# Patient Record
Sex: FEMALE | Race: WHITE | NOT HISPANIC OR LATINO | Employment: FULL TIME | URBAN - METROPOLITAN AREA
[De-identification: names, ages, dates, MRNs, and addresses within clinical notes are randomized per-mention and may not be internally consistent; named-entity substitution may affect disease eponyms.]

---

## 2022-10-07 ENCOUNTER — LAB (OUTPATIENT)
Dept: LAB | Facility: HOSPITAL | Age: 32
End: 2022-10-07

## 2022-10-07 ENCOUNTER — TRANSCRIBE ORDERS (OUTPATIENT)
Dept: LAB | Facility: HOSPITAL | Age: 32
End: 2022-10-07

## 2022-10-07 DIAGNOSIS — N97.9 PRIMARY FEMALE INFERTILITY: Primary | ICD-10-CM

## 2022-10-07 DIAGNOSIS — N97.9 PRIMARY FEMALE INFERTILITY: ICD-10-CM

## 2022-10-07 LAB
FSH SERPL-ACNC: 5.06 MIU/ML
LH SERPL-ACNC: 3.2 MIU/ML
TSH SERPL DL<=0.05 MIU/L-ACNC: 2.25 UIU/ML (ref 0.27–4.2)

## 2022-10-07 PROCEDURE — 83002 ASSAY OF GONADOTROPIN (LH): CPT

## 2022-10-07 PROCEDURE — 82627 DEHYDROEPIANDROSTERONE: CPT

## 2022-10-07 PROCEDURE — 84443 ASSAY THYROID STIM HORMONE: CPT

## 2022-10-07 PROCEDURE — 84403 ASSAY OF TOTAL TESTOSTERONE: CPT

## 2022-10-07 PROCEDURE — 82681 ASSAY DIR MEAS FR ESTRADIOL: CPT

## 2022-10-07 PROCEDURE — 36415 COLL VENOUS BLD VENIPUNCTURE: CPT

## 2022-10-07 PROCEDURE — 82670 ASSAY OF TOTAL ESTRADIOL: CPT

## 2022-10-07 PROCEDURE — 84402 ASSAY OF FREE TESTOSTERONE: CPT

## 2022-10-07 PROCEDURE — 83001 ASSAY OF GONADOTROPIN (FSH): CPT

## 2022-10-07 PROCEDURE — 83498 ASY HYDROXYPROGESTERONE 17-D: CPT

## 2022-10-08 LAB — DHEA-S SERPL-MCNC: 487 UG/DL (ref 84.8–378)

## 2022-10-10 LAB
TESTOST FREE SERPL-MCNC: 3.2 PG/ML (ref 0–4.2)
TESTOST SERPL-MCNC: 23 NG/DL (ref 8–60)

## 2022-10-17 LAB — 17OHP SERPL-MCNC: 63 NG/DL

## 2022-10-18 LAB
ESTRADIOL FREE MFR SERPL: 1.8 %
ESTRADIOL FREE SERPL-MCNC: 0.92 PG/ML
ESTRADIOL SERPL HS-MCNC: 51 PG/ML

## 2022-10-29 ENCOUNTER — TRANSCRIBE ORDERS (OUTPATIENT)
Dept: LAB | Facility: HOSPITAL | Age: 32
End: 2022-10-29

## 2022-10-29 ENCOUNTER — LAB (OUTPATIENT)
Dept: LAB | Facility: HOSPITAL | Age: 32
End: 2022-10-29

## 2022-10-29 DIAGNOSIS — N97.9 PRIMARY FEMALE INFERTILITY: Primary | ICD-10-CM

## 2022-10-29 DIAGNOSIS — N97.9 PRIMARY FEMALE INFERTILITY: ICD-10-CM

## 2022-10-29 LAB — PROGEST SERPL-MCNC: 15.9 NG/ML

## 2022-10-29 PROCEDURE — 36415 COLL VENOUS BLD VENIPUNCTURE: CPT

## 2022-10-29 PROCEDURE — 84144 ASSAY OF PROGESTERONE: CPT

## 2022-11-26 ENCOUNTER — LAB (OUTPATIENT)
Dept: LAB | Facility: HOSPITAL | Age: 32
End: 2022-11-26

## 2022-11-26 ENCOUNTER — TRANSCRIBE ORDERS (OUTPATIENT)
Dept: LAB | Facility: HOSPITAL | Age: 32
End: 2022-11-26

## 2022-11-26 DIAGNOSIS — N97.0 FEMALE INFERTILITY ASSOCIATED WITH ANOVULATION: ICD-10-CM

## 2022-11-26 DIAGNOSIS — N97.0 FEMALE INFERTILITY ASSOCIATED WITH ANOVULATION: Primary | ICD-10-CM

## 2022-11-26 LAB — PROGEST SERPL-MCNC: 22 NG/ML

## 2022-11-26 PROCEDURE — 36415 COLL VENOUS BLD VENIPUNCTURE: CPT

## 2022-11-26 PROCEDURE — 84144 ASSAY OF PROGESTERONE: CPT

## 2023-04-19 ENCOUNTER — TELEPHONE (OUTPATIENT)
Dept: CARDIOLOGY | Facility: CLINIC | Age: 33
End: 2023-04-19

## 2023-04-19 ENCOUNTER — OFFICE VISIT (OUTPATIENT)
Dept: CARDIOLOGY | Facility: CLINIC | Age: 33
End: 2023-04-19
Payer: COMMERCIAL

## 2023-04-19 VITALS
SYSTOLIC BLOOD PRESSURE: 118 MMHG | BODY MASS INDEX: 29.37 KG/M2 | HEART RATE: 90 BPM | HEIGHT: 64 IN | DIASTOLIC BLOOD PRESSURE: 80 MMHG | WEIGHT: 172 LBS

## 2023-04-19 DIAGNOSIS — R07.2 PRECORDIAL PAIN: Primary | ICD-10-CM

## 2023-04-19 DIAGNOSIS — E03.9 HYPOTHYROIDISM, UNSPECIFIED TYPE: ICD-10-CM

## 2023-04-19 DIAGNOSIS — K31.84 GASTROPARESIS: ICD-10-CM

## 2023-04-19 DIAGNOSIS — E11.9 TYPE 2 DIABETES MELLITUS WITHOUT COMPLICATION, WITHOUT LONG-TERM CURRENT USE OF INSULIN: ICD-10-CM

## 2023-04-19 DIAGNOSIS — R00.0 TACHYCARDIA: ICD-10-CM

## 2023-04-19 DIAGNOSIS — E88.81 METABOLIC SYNDROME: ICD-10-CM

## 2023-04-19 PROBLEM — E88.810 METABOLIC SYNDROME: Status: ACTIVE | Noted: 2023-04-19

## 2023-04-19 RX ORDER — PANTOPRAZOLE SODIUM 40 MG/1
40 TABLET, DELAYED RELEASE ORAL DAILY
COMMUNITY
Start: 2023-04-08

## 2023-04-19 RX ORDER — LEVOTHYROXINE SODIUM 88 UG/1
88 TABLET ORAL DAILY
COMMUNITY
Start: 2023-04-15

## 2023-04-19 RX ORDER — DICYCLOMINE HYDROCHLORIDE 10 MG/1
10 CAPSULE ORAL 2 TIMES DAILY WITH MEALS
Qty: 60 CAPSULE | Refills: 6 | Status: SHIPPED | OUTPATIENT
Start: 2023-04-19

## 2023-04-19 RX ORDER — ASPIRIN 81 MG/1
81 TABLET ORAL DAILY
COMMUNITY

## 2023-04-19 RX ORDER — METOPROLOL SUCCINATE 25 MG/1
25 TABLET, EXTENDED RELEASE ORAL DAILY
Qty: 30 TABLET | Refills: 11 | Status: SHIPPED | OUTPATIENT
Start: 2023-04-19

## 2023-04-19 RX ORDER — CETIRIZINE HYDROCHLORIDE 10 MG/1
10 TABLET ORAL DAILY
COMMUNITY

## 2023-04-19 NOTE — TELEPHONE ENCOUNTER
Pt requesting surgical clearance for hernia repair  [facility] Fax [phone]    We still have not received records from [facility] 4/15/23  Echo 4/16/23    Pt does take a daily ASA 81mg, they want her to hold for 7 days

## 2023-04-19 NOTE — PROGRESS NOTES
Chief Complaint   Patient presents with   • Establish Care     Pt is here to establish care at the request of her PCP.  Pt states she started having CP last Monday.  She was evaluated in ER at [facility] on Friday, per the pt she was told she had a MI.  She report numbness in her head then and has some at this time.  She states she still has been having CP, but denies SOB, dizziness or palpitations.  She has recently started a daily ASA.   • Cardiac History     Pt denies ever being evaluated by cardiology before Friday.  She had a cath, echo and CTA.  Records have been requested.   • Lab Work     Pt's last labs were 4/14/23 at ER.        CARDIAC COMPLAINTS  chest pressure/discomfort and fatigue      Subjective   Renettagerardo Monte is a 32 y.o. female came in today for her initial cardiac evaluation.  She has history of diabetes, hypothyroidism who has been having episodes of chest pains for a week now.  The pain is pressure-like pain in the mid part of the chest radiating up.  The symptoms got progressively worse.  She had an EKG done which showed some T wave changes.  She was referred to the emergency room at [place].  She was told that she had an MI.  She then underwent cardiac catheterization.  It was first attempted from the radial but they had some problem getting the catheter into the ascending aorta.  They then did it through the right femoral.  According to her no significant CAD noted.  I do not have any results.  She also had an echocardiogram done and was told that she may have pericarditis.  She was sent home with no change in the medication other than stopping the beta-blockers which she has been taking for a long time.  She still has some mild discomfort.  She notices her heart is racing now.  She also has feeling fatigue and tired.  She still continues to have some abdominal discomfort.  She is due to have hernia surgery done in the next few days.  She did undergo lab work recently and found to  have an mildly elevated is not felt.  Her blood sugar was 116.  SGPT was 39.  Cholesterol is 128 with the LDL of 78.  Her TSH was mildly elevated at 5.440 and A1c was 6.4.  She has no history of smoking, drinking alcohol or using any illicit drugs.  Hypertension and diabetes does run in the family    Past Surgical History:   Procedure Laterality Date   • CARDIAC CATHETERIZATION  04/15/2023   •  SECTION     • CHOLECYSTECTOMY     • ECHO - CONVERTED  2023   • EXPLORATORY LAPAROTOMY     • HAND SURGERY Right    • TONSILLECTOMY AND ADENOIDECTOMY         Current Outpatient Medications   Medication Sig Dispense Refill   • aspirin 81 MG EC tablet Take 1 tablet by mouth Daily.     • cetirizine (zyrTEC) 10 MG tablet Take 1 tablet by mouth Daily.     • ELDERBERRY PO Take  by mouth.     • levothyroxine (SYNTHROID, LEVOTHROID) 88 MCG tablet Take 1 tablet by mouth Daily.     • MAGNESIUM-ZINC PO Take  by mouth.     • metFORMIN (GLUCOPHAGE) 1000 MG tablet Take 1 tablet by mouth 2 (Two) Times a Day.     • pantoprazole (PROTONIX) 40 MG EC tablet Take 1 tablet by mouth Daily.     • sertraline (ZOLOFT) 50 MG tablet Take 1 tablet by mouth every night at bedtime.     • dicyclomine (BENTYL) 10 MG capsule Take 1 capsule by mouth 2 (Two) Times a Day With Meals. 60 capsule 6   • metoprolol succinate XL (TOPROL-XL) 25 MG 24 hr tablet Take 1 tablet by mouth Daily. 30 tablet 11     No current facility-administered medications for this visit.           ALLERGIES:  Alpha-gal, Gluten meal, and Sulfa antibiotics    Past Medical History:   Diagnosis Date   • Allergy to alpha-gal    • Chest pressure    • Diabetes mellitus    • Gluten intolerance    • Hypertension    • Hypothyroidism    • Myocardial infarction        Social History     Tobacco Use   Smoking Status Never   Smokeless Tobacco Never          Family History   Problem Relation Age of Onset   • Hypertension Mother    • Hypertension Father    • Hypertension Maternal Grandmother   "  • Diabetes Maternal Grandmother    • Hypertension Maternal Grandfather    • Diabetes Maternal Grandfather    • Heart disease Maternal Grandfather    • Cancer Paternal Grandmother    • Cancer Paternal Grandfather    • No Known Problems Daughter        Review of Systems   Constitutional: Positive for malaise/fatigue. Negative for decreased appetite.   HENT: Negative for congestion and sore throat.    Eyes: Negative for blurred vision, double vision and visual disturbance.   Cardiovascular: Positive for chest pain, dyspnea on exertion and palpitations.   Respiratory: Positive for shortness of breath. Negative for snoring.    Endocrine: Negative for cold intolerance and heat intolerance.   Hematologic/Lymphatic: Negative for adenopathy. Does not bruise/bleed easily.   Skin: Negative for itching, nail changes and skin cancer.   Musculoskeletal: Negative for arthritis and myalgias.   Gastrointestinal: Positive for abdominal pain. Negative for dysphagia and heartburn.   Genitourinary: Negative for bladder incontinence and frequency.   Neurological: Negative for dizziness, seizures and vertigo.   Psychiatric/Behavioral: Negative for altered mental status.   Allergic/Immunologic: Negative for environmental allergies and hives.       Diabetes- Yes  Thyroid- abnormal    Objective     /80 (BP Location: Right arm, Patient Position: Sitting)   Pulse 90   Ht 162.6 cm (64\")   Wt 78 kg (172 lb)   BMI 29.52 kg/m²     Vitals and nursing note reviewed.   Constitutional:       Appearance: Healthy appearance. Not in distress.   Eyes:      Conjunctiva/sclera: Conjunctivae normal.      Pupils: Pupils are equal, round, and reactive to light.   HENT:      Head: Normocephalic.   Pulmonary:      Effort: Pulmonary effort is normal.      Breath sounds: Normal breath sounds.   Cardiovascular:      PMI at left midclavicular line. Normal rate. Regular rhythm.      Murmurs: There is a grade 3/6 high frequency blowing holosystolic murmur " at the apex.   Abdominal:      General: Bowel sounds are normal.      Palpations: Abdomen is soft.   Musculoskeletal: Normal range of motion.      Cervical back: Normal range of motion and neck supple. Skin:     General: Skin is warm and dry.   Neurological:      Mental Status: Alert, oriented to person, place, and time and oriented to person, place and time.           ECG 12 Lead    Date/Time: 4/19/2023 1:15 PM  Performed by: Erik Forde MD  Authorized by: Erik Forde MD   Comparison: compared with previous ECG from 4/11/2023  Similar to previous ECG  Rhythm: sinus rhythm  Rate: normal  Conduction: left anterior fascicular block  QRS axis: left  Other findings: non-specific ST-T wave changes    Clinical impression: abnormal EKG              @ASSESSMENT/PLAN@  BMI is >= 25 and <30. (Overweight) The following options were offered after discussion;: weight loss educational material (shared in after visit summary), exercise counseling/recommendations and nutrition counseling/recommendations     Diagnoses and all orders for this visit:    1. Precordial pain (Primary)  -     metoprolol succinate XL (TOPROL-XL) 25 MG 24 hr tablet; Take 1 tablet by mouth Daily.  Dispense: 30 tablet; Refill: 11  -     dicyclomine (BENTYL) 10 MG capsule; Take 1 capsule by mouth 2 (Two) Times a Day With Meals.  Dispense: 60 capsule; Refill: 6  -     High Sensitivity CRP; Future  -     Sedimentation Rate; Future    2. Type 2 diabetes mellitus without complication, without long-term current use of insulin    3. Hypothyroidism, unspecified type    4. Gastroparesis  -     dicyclomine (BENTYL) 10 MG capsule; Take 1 capsule by mouth 2 (Two) Times a Day With Meals.  Dispense: 60 capsule; Refill: 6    5. Metabolic syndrome    6. Tachycardia  -     metoprolol succinate XL (TOPROL-XL) 25 MG 24 hr tablet; Take 1 tablet by mouth Daily.  Dispense: 30 tablet; Refill: 11       At baseline her heart rate is upper limit of normal.  Her blood  pressure is stable.  Her EKG showed sinus rhythm, left axis deviation, nonspecific ST-T changes.  Her clinical examination reveals a BMI of 30.  She has systolic murmur at the mitral area.  I did not appreciate any pericardial rub at this time.  Her abdominal examination showed mild tenderness involving the right hypochondriac area    Regarding the chest pain, it appears to be very atypical.  She may have gastroparesis from her description.  I explained to her that she may need to undergo an EGD in the near future.  At this time continue the PPI but I added Bentyl 10 mg twice a day.  I also advised her to restart beta-blockers in the form of Toprol-XL at 25 mg once a day.  She is advised to check her CRP level and the sed rate.  If it is elevated then she may need anti-inflammatory medication in the form of colchicine.    Regarding her diabetes, she is on metformin.  She used to be on Ozempic in the past which she stopped.  I advised her not to take any Ozempic at this time till rechecked GI    Regarding her hypothyroidism she is on thyroid supplements.  Her TSH is borderline high.  She is advised to talk to you regarding the dosage    Regarding the possibility of gastroparesis was explained to her in detail.  Taking Bentyl may be beneficial    Regarding metabolic syndrome, talked to her about diet and weight reduction.  I gave her papers on Mediterranean diet    Regarding the tachycardia, it seems to be chronic and it has gone up after abruptly stopping the beta-blockers.  Restarting will be beneficial    Regarding the surgery, she should be able to go for surgery with usual precaution.  Cardiac clearance will be given    I will see her back in 6 months.  She is advised to call our office if she develops any more palpitations and we will put a monitor             Electronically signed by Erik Forde MD April 19, 2023 13:05 EDT

## 2023-04-19 NOTE — LETTER
April 19, 2023       No Recipients    Patient: Renetta Monte   YOB: 1990   Date of Visit: 4/19/2023       Dear Dr. Rodriguez Recipients:    Thank you for referring Renetta Monte to me for evaluation. Below are the relevant portions of my assessment and plan of care.    If you have questions, please do not hesitate to call me. I look forward to following Renetta along with you.         Sincerely,        Erik Forde MD        CC:   No Recipients    Erik Forde MD  04/19/23 1315  Sign when Signing Visit  Chief Complaint   Patient presents with   • Establish Care     Pt is here to establish care at the request of her PCP.  Pt states she started having CP last Monday.  She was evaluated in ER at [facility] on Friday, per the pt she was told she had a MI.  She report numbness in her head then and has some at this time.  She states she still has been having CP, but denies SOB, dizziness or palpitations.  She has recently started a daily ASA.   • Cardiac History     Pt denies ever being evaluated by cardiology before Friday.  She had a cath, echo and CTA.  Records have been requested.   • Lab Work     Pt's last labs were 4/14/23 at ER.        CARDIAC COMPLAINTS  chest pressure/discomfort and fatigue     Subjective   Renetta Monet is a 32 y.o. female came in today for her initial cardiac evaluation.  She has history of diabetes, hypothyroidism who has been having episodes of chest pains for a week now.  The pain is pressure-like pain in the mid part of the chest radiating up.  The symptoms got progressively worse.  She had an EKG done which showed some T wave changes.  She was referred to the emergency room at [place].  She was told that she had an MI.  She then underwent cardiac catheterization.  It was first attempted from the radial but they had some problem getting the catheter into the ascending aorta.  They then did it through the right femoral.  According to her no  significant CAD noted.  I do not have any results.  She also had an echocardiogram done and was told that she may have pericarditis.  She was sent home with no change in the medication other than stopping the beta-blockers which she has been taking for a long time.  She still has some mild discomfort.  She notices her heart is racing now.  She also has feeling fatigue and tired.  She still continues to have some abdominal discomfort.  She is due to have hernia surgery done in the next few days.  She did undergo lab work recently and found to have an mildly elevated is not felt.  Her blood sugar was 116.  SGPT was 39.  Cholesterol is 128 with the LDL of 78.  Her TSH was mildly elevated at 5.440 and A1c was 6.4.  She has no history of smoking, drinking alcohol or using any illicit drugs.  Hypertension and diabetes does run in the family    Past Surgical History:   Procedure Laterality Date   • CARDIAC CATHETERIZATION  04/15/2023   •  SECTION     • CHOLECYSTECTOMY     • ECHO - CONVERTED  2023   • EXPLORATORY LAPAROTOMY     • HAND SURGERY Right    • TONSILLECTOMY AND ADENOIDECTOMY         Current Outpatient Medications   Medication Sig Dispense Refill   • aspirin 81 MG EC tablet Take 1 tablet by mouth Daily.     • cetirizine (zyrTEC) 10 MG tablet Take 1 tablet by mouth Daily.     • ELDERBERRY PO Take  by mouth.     • levothyroxine (SYNTHROID, LEVOTHROID) 88 MCG tablet Take 1 tablet by mouth Daily.     • MAGNESIUM-ZINC PO Take  by mouth.     • metFORMIN (GLUCOPHAGE) 1000 MG tablet Take 1 tablet by mouth 2 (Two) Times a Day.     • pantoprazole (PROTONIX) 40 MG EC tablet Take 1 tablet by mouth Daily.     • sertraline (ZOLOFT) 50 MG tablet Take 1 tablet by mouth every night at bedtime.     • dicyclomine (BENTYL) 10 MG capsule Take 1 capsule by mouth 2 (Two) Times a Day With Meals. 60 capsule 6   • metoprolol succinate XL (TOPROL-XL) 25 MG 24 hr tablet Take 1 tablet by mouth Daily. 30 tablet 11     No current  "facility-administered medications for this visit.          ALLERGIES:  Alpha-gal, Gluten meal, and Sulfa antibiotics    Past Medical History:   Diagnosis Date   • Allergy to alpha-gal    • Chest pressure    • Diabetes mellitus    • Gluten intolerance    • Hypertension    • Hypothyroidism    • Myocardial infarction        Social History     Tobacco Use   Smoking Status Never   Smokeless Tobacco Never          Family History   Problem Relation Age of Onset   • Hypertension Mother    • Hypertension Father    • Hypertension Maternal Grandmother    • Diabetes Maternal Grandmother    • Hypertension Maternal Grandfather    • Diabetes Maternal Grandfather    • Heart disease Maternal Grandfather    • Cancer Paternal Grandmother    • Cancer Paternal Grandfather    • No Known Problems Daughter        Review of Systems   Constitutional: Positive for malaise/fatigue. Negative for decreased appetite.   HENT: Negative for congestion and sore throat.    Eyes: Negative for blurred vision, double vision and visual disturbance.   Cardiovascular: Positive for chest pain, dyspnea on exertion and palpitations.   Respiratory: Positive for shortness of breath. Negative for snoring.    Endocrine: Negative for cold intolerance and heat intolerance.   Hematologic/Lymphatic: Negative for adenopathy. Does not bruise/bleed easily.   Skin: Negative for itching, nail changes and skin cancer.   Musculoskeletal: Negative for arthritis and myalgias.   Gastrointestinal: Positive for abdominal pain. Negative for dysphagia and heartburn.   Genitourinary: Negative for bladder incontinence and frequency.   Neurological: Negative for dizziness, seizures and vertigo.   Psychiatric/Behavioral: Negative for altered mental status.   Allergic/Immunologic: Negative for environmental allergies and hives.       Diabetes- Yes  Thyroid- abnormal    Objective     /80 (BP Location: Right arm, Patient Position: Sitting)   Pulse 90   Ht 162.6 cm (64\")   Wt 78 " kg (172 lb)   BMI 29.52 kg/m²     Vitals and nursing note reviewed.   Constitutional:       Appearance: Healthy appearance. Not in distress.   Eyes:      Conjunctiva/sclera: Conjunctivae normal.      Pupils: Pupils are equal, round, and reactive to light.   HENT:      Head: Normocephalic.   Pulmonary:      Effort: Pulmonary effort is normal.      Breath sounds: Normal breath sounds.   Cardiovascular:      PMI at left midclavicular line. Normal rate. Regular rhythm.      Murmurs: There is a grade 3/6 high frequency blowing holosystolic murmur at the apex.   Abdominal:      General: Bowel sounds are normal.      Palpations: Abdomen is soft.   Musculoskeletal: Normal range of motion.      Cervical back: Normal range of motion and neck supple. Skin:     General: Skin is warm and dry.   Neurological:      Mental Status: Alert, oriented to person, place, and time and oriented to person, place and time.           ECG 12 Lead    Date/Time: 4/19/2023 1:15 PM  Performed by: Erik Forde MD  Authorized by: Erik Forde MD   Comparison: compared with previous ECG from 4/11/2023  Similar to previous ECG  Rhythm: sinus rhythm  Rate: normal  Conduction: left anterior fascicular block  QRS axis: left  Other findings: non-specific ST-T wave changes    Clinical impression: abnormal EKG             @ASSESSMENT/PLAN@  BMI is >= 25 and <30. (Overweight) The following options were offered after discussion;: weight loss educational material (shared in after visit summary), exercise counseling/recommendations and nutrition counseling/recommendations     Diagnoses and all orders for this visit:    1. Precordial pain (Primary)  -     metoprolol succinate XL (TOPROL-XL) 25 MG 24 hr tablet; Take 1 tablet by mouth Daily.  Dispense: 30 tablet; Refill: 11  -     dicyclomine (BENTYL) 10 MG capsule; Take 1 capsule by mouth 2 (Two) Times a Day With Meals.  Dispense: 60 capsule; Refill: 6  -     High Sensitivity CRP; Future  -      Sedimentation Rate; Future    2. Type 2 diabetes mellitus without complication, without long-term current use of insulin    3. Hypothyroidism, unspecified type    4. Gastroparesis  -     dicyclomine (BENTYL) 10 MG capsule; Take 1 capsule by mouth 2 (Two) Times a Day With Meals.  Dispense: 60 capsule; Refill: 6    5. Metabolic syndrome    6. Tachycardia  -     metoprolol succinate XL (TOPROL-XL) 25 MG 24 hr tablet; Take 1 tablet by mouth Daily.  Dispense: 30 tablet; Refill: 11       At baseline her heart rate is upper limit of normal.  Her blood pressure is stable.  Her EKG showed sinus rhythm, left axis deviation, nonspecific ST-T changes.  Her clinical examination reveals a BMI of 30.  She has systolic murmur at the mitral area.  I did not appreciate any pericardial rub at this time.  Her abdominal examination showed mild tenderness involving the right hypochondriac area    Regarding the chest pain, it appears to be very atypical.  She may have gastroparesis from her description.  I explained to her that she may need to undergo an EGD in the near future.  At this time continue the PPI but I added Bentyl 10 mg twice a day.  I also advised her to restart beta-blockers in the form of Toprol-XL at 25 mg once a day.  She is advised to check her CRP level and the sed rate.  If it is elevated then she may need anti-inflammatory medication in the form of colchicine.    Regarding her diabetes, she is on metformin.  She used to be on Ozempic in the past which she stopped.  I advised her not to take any Ozempic at this time till rechecked GI    Regarding her hypothyroidism she is on thyroid supplements.  Her TSH is borderline high.  She is advised to talk to you regarding the dosage    Regarding the possibility of gastroparesis was explained to her in detail.  Taking Bentyl may be beneficial    Regarding metabolic syndrome, talked to her about diet and weight reduction.  I gave her papers on Mediterranean diet    Regarding  the tachycardia, it seems to be chronic and it has gone up after abruptly stopping the beta-blockers.  Restarting will be beneficial    Regarding the surgery, she should be able to go for surgery with usual precaution.  Cardiac clearance will be given    I will see her back in 6 months.  She is advised to call our office if she develops any more palpitations and we will put a monitor            Electronically signed by Erik Forde MD April 19, 2023 13:05 EDT

## 2023-08-14 ENCOUNTER — TRANSCRIBE ORDERS (OUTPATIENT)
Dept: LAB | Facility: HOSPITAL | Age: 33
End: 2023-08-14
Payer: COMMERCIAL

## 2023-08-14 ENCOUNTER — LAB (OUTPATIENT)
Dept: LAB | Facility: HOSPITAL | Age: 33
End: 2023-08-14
Payer: COMMERCIAL

## 2023-08-14 ENCOUNTER — TELEPHONE (OUTPATIENT)
Dept: CARDIOLOGY | Facility: CLINIC | Age: 33
End: 2023-08-14
Payer: COMMERCIAL

## 2023-08-14 DIAGNOSIS — N92.6 IRREGULAR MENSTRUAL CYCLE: ICD-10-CM

## 2023-08-14 DIAGNOSIS — N92.6 IRREGULAR MENSTRUAL CYCLE: Primary | ICD-10-CM

## 2023-08-14 LAB
HCG INTACT+B SERPL-ACNC: NORMAL MIU/ML
PROGEST SERPL-MCNC: 9.92 NG/ML

## 2023-08-14 PROCEDURE — 84144 ASSAY OF PROGESTERONE: CPT

## 2023-08-14 PROCEDURE — 36415 COLL VENOUS BLD VENIPUNCTURE: CPT

## 2023-08-14 PROCEDURE — 84702 CHORIONIC GONADOTROPIN TEST: CPT

## 2023-08-14 NOTE — TELEPHONE ENCOUNTER
Caller: Lavell Renetta NIEVES    Relationship: Self    Best call back number: [phone]     What medications are you currently taking:   Current Outpatient Medications on File Prior to Visit   Medication Sig Dispense Refill    aspirin 81 MG EC tablet Take 1 tablet by mouth Daily.      cetirizine (zyrTEC) 10 MG tablet Take 1 tablet by mouth Daily.      dicyclomine (BENTYL) 10 MG capsule Take 1 capsule by mouth 2 (Two) Times a Day With Meals. 60 capsule 6    ELDERBERRY PO Take  by mouth.      levothyroxine (SYNTHROID, LEVOTHROID) 88 MCG tablet Take 1 tablet by mouth Daily.      MAGNESIUM-ZINC PO Take  by mouth.      metFORMIN (GLUCOPHAGE) 1000 MG tablet Take 1 tablet by mouth 2 (Two) Times a Day.      metoprolol succinate XL (TOPROL-XL) 25 MG 24 hr tablet Take 1 tablet by mouth Daily. 30 tablet 11    pantoprazole (PROTONIX) 40 MG EC tablet Take 1 tablet by mouth Daily.      sertraline (ZOLOFT) 50 MG tablet Take 1 tablet by mouth every night at bedtime.       No current facility-administered medications on file prior to visit.          When did you start taking these medications: MAY 13TH, 2023     Which medication are you concerned about: ASPIRIN 81MG     Who prescribed you this medication: MD PANFILO     What are your concerns: PT RECENTLY FOUND OUT SHE IS PREGNANT, AND HAS A POSTIVE HCG. SHE WAS RECENTLY TAKEEN OFF OF THE ASPRIN 81MG. PTS OBGYN IS WONDERING IF SHE NEEDS TO GO BACK ON THIS MEDICATION FOR CLOTTING OR IF SHE NEEDS TO COME IN TO SEE THE DR ABOUT MED CHANGES.

## 2023-08-14 NOTE — TELEPHONE ENCOUNTER
Patient found out that they are recently pregnant, she was recently taken off ASA 81 mg and OBGYN was wanting to know if she needs to go back on aspirin and if her current medications are okay to continue to take.

## 2023-08-30 ENCOUNTER — LAB (OUTPATIENT)
Dept: LAB | Facility: HOSPITAL | Age: 33
End: 2023-08-30
Payer: COMMERCIAL

## 2023-08-30 ENCOUNTER — TRANSCRIBE ORDERS (OUTPATIENT)
Dept: LAB | Facility: HOSPITAL | Age: 33
End: 2023-08-30
Payer: COMMERCIAL

## 2023-08-30 ENCOUNTER — TRANSCRIBE ORDERS (OUTPATIENT)
Dept: OBSTETRICS AND GYNECOLOGY | Facility: HOSPITAL | Age: 33
End: 2023-08-30
Payer: COMMERCIAL

## 2023-08-30 DIAGNOSIS — I25.2 HISTORY OF ACUTE ANTERIOR WALL MYOCARDIAL INFARCTION: ICD-10-CM

## 2023-08-30 DIAGNOSIS — Z3A.08 8 WEEKS GESTATION OF PREGNANCY: ICD-10-CM

## 2023-08-30 DIAGNOSIS — O09.891 MEDICATION EXPOSURE DURING FIRST TRIMESTER OF PREGNANCY: Primary | ICD-10-CM

## 2023-08-30 DIAGNOSIS — O09.529 ANTEPARTUM MULTIGRAVIDA OF ADVANCED MATERNAL AGE: Primary | ICD-10-CM

## 2023-08-30 DIAGNOSIS — O09.891 MEDICATION EXPOSURE DURING FIRST TRIMESTER OF PREGNANCY: ICD-10-CM

## 2023-08-30 DIAGNOSIS — E11.9 TYPE 2 DIABETES MELLITUS WITHOUT COMPLICATION, WITHOUT LONG-TERM CURRENT USE OF INSULIN: ICD-10-CM

## 2023-08-30 DIAGNOSIS — Z98.891 HISTORY OF C-SECTION: ICD-10-CM

## 2023-08-30 LAB
ABO GROUP BLD: NORMAL
ALBUMIN SERPL-MCNC: 4.5 G/DL (ref 3.5–5.2)
ALBUMIN/GLOB SERPL: 1.7 G/DL
ALP SERPL-CCNC: 67 U/L (ref 39–117)
ALT SERPL W P-5'-P-CCNC: 22 U/L (ref 1–33)
AMPHET+METHAMPHET UR QL: NEGATIVE
AMPHETAMINES UR QL: NEGATIVE
ANION GAP SERPL CALCULATED.3IONS-SCNC: 11.3 MMOL/L (ref 5–15)
AST SERPL-CCNC: 20 U/L (ref 1–32)
BACTERIA UR QL AUTO: ABNORMAL /HPF
BARBITURATES UR QL SCN: NEGATIVE
BENZODIAZ UR QL SCN: NEGATIVE
BILIRUB SERPL-MCNC: 0.3 MG/DL (ref 0–1.2)
BILIRUB UR QL STRIP: NEGATIVE
BLD GP AB SCN SERPL QL: NEGATIVE
BUN SERPL-MCNC: 10 MG/DL (ref 6–20)
BUN/CREAT SERPL: 13.3 (ref 7–25)
BUPRENORPHINE SERPL-MCNC: NEGATIVE NG/ML
CALCIUM SPEC-SCNC: 9.6 MG/DL (ref 8.6–10.5)
CANNABINOIDS SERPL QL: NEGATIVE
CHLORIDE SERPL-SCNC: 101 MMOL/L (ref 98–107)
CLARITY UR: CLEAR
CO2 SERPL-SCNC: 24.7 MMOL/L (ref 22–29)
COCAINE UR QL: NEGATIVE
COLOR UR: YELLOW
CREAT SERPL-MCNC: 0.75 MG/DL (ref 0.57–1)
CREAT UR-MCNC: 208.1 MG/DL
DEPRECATED RDW RBC AUTO: 41.8 FL (ref 37–54)
EGFRCR SERPLBLD CKD-EPI 2021: 106.6 ML/MIN/1.73
ERYTHROCYTE [DISTWIDTH] IN BLOOD BY AUTOMATED COUNT: 12.7 % (ref 12.3–15.4)
FENTANYL UR-MCNC: NEGATIVE NG/ML
GLOBULIN UR ELPH-MCNC: 2.7 GM/DL
GLUCOSE SERPL-MCNC: 138 MG/DL (ref 65–99)
GLUCOSE UR STRIP-MCNC: NEGATIVE MG/DL
HBA1C MFR BLD: 6.7 % (ref 4.8–5.6)
HBV SURFACE AG SERPL QL IA: NORMAL
HCT VFR BLD AUTO: 38 % (ref 34–46.6)
HCV AB SER DONR QL: NORMAL
HGB BLD-MCNC: 12.4 G/DL (ref 12–15.9)
HGB UR QL STRIP.AUTO: NEGATIVE
HIV 1+2 AB+HIV1 P24 AG SERPL QL IA: NORMAL
HYALINE CASTS UR QL AUTO: ABNORMAL /LPF
KETONES UR QL STRIP: ABNORMAL
LDH SERPL-CCNC: 131 U/L (ref 135–214)
LEUKOCYTE ESTERASE UR QL STRIP.AUTO: ABNORMAL
MCH RBC QN AUTO: 29.6 PG (ref 26.6–33)
MCHC RBC AUTO-ENTMCNC: 32.6 G/DL (ref 31.5–35.7)
MCV RBC AUTO: 90.7 FL (ref 79–97)
METHADONE UR QL SCN: NEGATIVE
MUCOUS THREADS URNS QL MICRO: ABNORMAL /HPF
NITRITE UR QL STRIP: NEGATIVE
OPIATES UR QL: NEGATIVE
OXYCODONE UR QL SCN: NEGATIVE
PCP UR QL SCN: NEGATIVE
PH UR STRIP.AUTO: 6.5 [PH] (ref 5–8)
PLATELET # BLD AUTO: 179 10*3/MM3 (ref 140–450)
PMV BLD AUTO: 12.1 FL (ref 6–12)
POTASSIUM SERPL-SCNC: 4.5 MMOL/L (ref 3.5–5.2)
PROPOXYPH UR QL: NEGATIVE
PROT ?TM UR-MCNC: 25.7 MG/DL
PROT SERPL-MCNC: 7.2 G/DL (ref 6–8.5)
PROT UR QL STRIP: ABNORMAL
PROT/CREAT UR: 123.5 MG/G CREA (ref 0–200)
RBC # BLD AUTO: 4.19 10*6/MM3 (ref 3.77–5.28)
RBC # UR STRIP: ABNORMAL /HPF
REF LAB TEST METHOD: ABNORMAL
RH BLD: POSITIVE
SODIUM SERPL-SCNC: 137 MMOL/L (ref 136–145)
SP GR UR STRIP: >=1.03 (ref 1–1.03)
SQUAMOUS #/AREA URNS HPF: ABNORMAL /HPF
TRICYCLICS UR QL SCN: NEGATIVE
URATE SERPL-MCNC: 3.8 MG/DL (ref 2.4–5.7)
UROBILINOGEN UR QL STRIP: ABNORMAL
WBC # UR STRIP: ABNORMAL /HPF
WBC NRBC COR # BLD: 8.17 10*3/MM3 (ref 3.4–10.8)

## 2023-08-30 PROCEDURE — 87086 URINE CULTURE/COLONY COUNT: CPT

## 2023-08-30 PROCEDURE — 87591 N.GONORRHOEAE DNA AMP PROB: CPT | Performed by: OBSTETRICS & GYNECOLOGY

## 2023-08-30 PROCEDURE — 86901 BLOOD TYPING SEROLOGIC RH(D): CPT

## 2023-08-30 PROCEDURE — G0432 EIA HIV-1/HIV-2 SCREEN: HCPCS

## 2023-08-30 PROCEDURE — 81001 URINALYSIS AUTO W/SCOPE: CPT | Performed by: OBSTETRICS & GYNECOLOGY

## 2023-08-30 PROCEDURE — 87491 CHLMYD TRACH DNA AMP PROBE: CPT | Performed by: OBSTETRICS & GYNECOLOGY

## 2023-08-30 PROCEDURE — 86850 RBC ANTIBODY SCREEN: CPT

## 2023-08-30 PROCEDURE — 84156 ASSAY OF PROTEIN URINE: CPT

## 2023-08-30 PROCEDURE — 84550 ASSAY OF BLOOD/URIC ACID: CPT

## 2023-08-30 PROCEDURE — 87340 HEPATITIS B SURFACE AG IA: CPT

## 2023-08-30 PROCEDURE — 86762 RUBELLA ANTIBODY: CPT

## 2023-08-30 PROCEDURE — 36415 COLL VENOUS BLD VENIPUNCTURE: CPT

## 2023-08-30 PROCEDURE — 86780 TREPONEMA PALLIDUM: CPT

## 2023-08-30 PROCEDURE — 83615 LACTATE (LD) (LDH) ENZYME: CPT

## 2023-08-30 PROCEDURE — 80307 DRUG TEST PRSMV CHEM ANLYZR: CPT

## 2023-08-30 PROCEDURE — 85027 COMPLETE CBC AUTOMATED: CPT

## 2023-08-30 PROCEDURE — 82570 ASSAY OF URINE CREATININE: CPT

## 2023-08-30 PROCEDURE — 86803 HEPATITIS C AB TEST: CPT

## 2023-08-30 PROCEDURE — 83036 HEMOGLOBIN GLYCOSYLATED A1C: CPT

## 2023-08-30 PROCEDURE — 86900 BLOOD TYPING SEROLOGIC ABO: CPT

## 2023-08-30 PROCEDURE — 80053 COMPREHEN METABOLIC PANEL: CPT

## 2023-08-31 LAB
BACTERIA SPEC AEROBE CULT: NORMAL
RUBV IGG SERPL IA-ACNC: 4.37 INDEX

## 2023-09-01 ENCOUNTER — TELEPHONE (OUTPATIENT)
Dept: CARDIOLOGY | Facility: CLINIC | Age: 33
End: 2023-09-01
Payer: COMMERCIAL

## 2023-09-01 LAB — TREPONEMA PALLIDUM IGG+IGM AB [PRESENCE] IN SERUM OR PLASMA BY IMMUNOASSAY: NON REACTIVE

## 2023-09-01 NOTE — TELEPHONE ENCOUNTER
Caller: Renetta Monte    Relationship: Self    Best call back number: [phone]     What is the best time to reach you: ANY      What was the call regarding: PT IS CURRENTLY PREGNANT AND HER OB DR. DUDLEY WITH [facility] IS WANTING TO KNOW IF SHE WOULD BE CLEAR TO GET AN EKG AND AN ECHO. PT IS OKAY TO COME IN NEXT ANYTIME AFTER NINE AND HAS TO PICK DAUGHTER UP AT 11:15 am FOR THE EKG BUT NEEDS AN APPT TO SCHEDULE THE ECHO IF THIS IS OKAY TO DO.    Is it okay if the provider responds through MyChart: NO

## 2023-09-02 LAB
C TRACH RRNA SPEC QL NAA+PROBE: NEGATIVE
N GONORRHOEA RRNA SPEC QL NAA+PROBE: NEGATIVE

## 2023-09-05 ENCOUNTER — TELEPHONE (OUTPATIENT)
Dept: CARDIOLOGY | Facility: CLINIC | Age: 33
End: 2023-09-05
Payer: COMMERCIAL

## 2023-09-05 DIAGNOSIS — R01.1 HEART MURMUR: ICD-10-CM

## 2023-09-05 DIAGNOSIS — R00.0 TACHYCARDIA: ICD-10-CM

## 2023-09-05 DIAGNOSIS — R07.2 PRECORDIAL PAIN: Primary | ICD-10-CM

## 2023-09-05 NOTE — TELEPHONE ENCOUNTER
Pt called, her OBGYN is also wanting an EKG as well as an Echo. You ordered the echo today.  Do you want to order the EKG or do I need to call the OBGYN for the order?

## 2023-09-06 DIAGNOSIS — R00.0 TACHYCARDIA: Primary | ICD-10-CM

## 2023-09-07 ENCOUNTER — HOSPITAL ENCOUNTER (OUTPATIENT)
Dept: CARDIOLOGY | Facility: HOSPITAL | Age: 33
Discharge: HOME | End: 2023-09-07
Admitting: INTERNAL MEDICINE
Payer: COMMERCIAL

## 2023-09-07 ENCOUNTER — CLINICAL SUPPORT (OUTPATIENT)
Dept: CARDIOLOGY | Facility: CLINIC | Age: 33
End: 2023-09-07
Payer: COMMERCIAL

## 2023-09-07 VITALS — WEIGHT: 171.96 LBS | BODY MASS INDEX: 29.36 KG/M2 | HEIGHT: 64 IN

## 2023-09-07 VITALS — SYSTOLIC BLOOD PRESSURE: 116 MMHG | DIASTOLIC BLOOD PRESSURE: 60 MMHG

## 2023-09-07 DIAGNOSIS — R00.0 TACHYCARDIA: ICD-10-CM

## 2023-09-07 DIAGNOSIS — R00.0 TACHYCARDIA: Primary | ICD-10-CM

## 2023-09-07 DIAGNOSIS — R07.2 PRECORDIAL PAIN: ICD-10-CM

## 2023-09-07 DIAGNOSIS — R01.1 HEART MURMUR: ICD-10-CM

## 2023-09-07 LAB
AORTIC DIMENSIONLESS INDEX: 0.85 (DI)
BH CV ECHO MEAS - ACS: 1.81 CM
BH CV ECHO MEAS - AO MAX PG: 4.3 MMHG
BH CV ECHO MEAS - AO MEAN PG: 2.6 MMHG
BH CV ECHO MEAS - AO ROOT DIAM: 3.2 CM
BH CV ECHO MEAS - AO V2 MAX: 103.4 CM/SEC
BH CV ECHO MEAS - AO V2 VTI: 21.6 CM
BH CV ECHO MEAS - EDV(CUBED): 97.3 ML
BH CV ECHO MEAS - EF(MOD-BP): 57 %
BH CV ECHO MEAS - EF_3D-VOL: 57 %
BH CV ECHO MEAS - ESV(CUBED): 33.8 ML
BH CV ECHO MEAS - FS: 29.7 %
BH CV ECHO MEAS - IVS/LVPW: 1.04 CM
BH CV ECHO MEAS - IVSD: 1.1 CM
BH CV ECHO MEAS - LA DIMENSION: 3.7 CM
BH CV ECHO MEAS - LAT PEAK E' VEL: 11 CM/SEC
BH CV ECHO MEAS - LV MASS(C)D: 176.6 GRAMS
BH CV ECHO MEAS - LV MAX PG: 3.1 MMHG
BH CV ECHO MEAS - LV MEAN PG: 1.55 MMHG
BH CV ECHO MEAS - LV V1 MAX: 87.8 CM/SEC
BH CV ECHO MEAS - LV V1 VTI: 18.9 CM
BH CV ECHO MEAS - LVIDD: 4.6 CM
BH CV ECHO MEAS - LVIDS: 3.2 CM
BH CV ECHO MEAS - LVPWD: 1.06 CM
BH CV ECHO MEAS - MED PEAK E' VEL: 6.4 CM/SEC
BH CV ECHO MEAS - MV A MAX VEL: 54.3 CM/SEC
BH CV ECHO MEAS - MV DEC SLOPE: 413 CM/SEC2
BH CV ECHO MEAS - MV DEC TIME: 0.18 MSEC
BH CV ECHO MEAS - MV E MAX VEL: 49.4 CM/SEC
BH CV ECHO MEAS - MV E/A: 0.91
BH CV ECHO MEAS - MV MAX PG: 2.04 MMHG
BH CV ECHO MEAS - MV MEAN PG: 1.1 MMHG
BH CV ECHO MEAS - MV P1/2T: 52.8 MSEC
BH CV ECHO MEAS - MV V2 VTI: 16.8 CM
BH CV ECHO MEAS - MVA(P1/2T): 4.2 CM2
BH CV ECHO MEAS - PA V2 MAX: 86 CM/SEC
BH CV ECHO MEAS - RAP SYSTOLE: 8 MMHG
BH CV ECHO MEAS - RV MAX PG: 2.4 MMHG
BH CV ECHO MEAS - RV V1 MAX: 77.5 CM/SEC
BH CV ECHO MEAS - RV V1 VTI: 15.6 CM
BH CV ECHO MEAS - RVSP: 12.4 MMHG
BH CV ECHO MEAS - TAPSE (>1.6): 2.01 CM
BH CV ECHO MEAS - TR MAX PG: 4.4 MMHG
BH CV ECHO MEAS - TR MAX VEL: 104.3 CM/SEC
BH CV ECHO MEASUREMENTS AVERAGE E/E' RATIO: 5.68
BH CV XLRA - TDI S': 10.9 CM/SEC
SINUS: 3.2 CM

## 2023-09-07 PROCEDURE — 93306 TTE W/DOPPLER COMPLETE: CPT

## 2023-09-07 PROCEDURE — 93000 ELECTROCARDIOGRAM COMPLETE: CPT | Performed by: INTERNAL MEDICINE

## 2023-09-07 NOTE — PROGRESS NOTES
Procedure     ECG 12 Lead    Date/Time: 9/7/2023 1:24 PM  Performed by: Erik Forde MD  Authorized by: Erik Forde MD   Comparison: compared with previous ECG from 4/19/2023  Similar to previous ECG  Rhythm: sinus rhythm  Rate: normal  QRS axis: normal        4 = No assist / stand by assistance

## 2023-09-11 ENCOUNTER — LAB (OUTPATIENT)
Dept: LAB | Facility: HOSPITAL | Age: 33
End: 2023-09-11
Payer: COMMERCIAL

## 2023-09-11 ENCOUNTER — TRANSCRIBE ORDERS (OUTPATIENT)
Dept: LAB | Facility: HOSPITAL | Age: 33
End: 2023-09-11
Payer: COMMERCIAL

## 2023-09-11 DIAGNOSIS — I51.9 MYXEDEMA HEART DISEASE: Primary | ICD-10-CM

## 2023-09-11 DIAGNOSIS — E03.9 MYXEDEMA HEART DISEASE: ICD-10-CM

## 2023-09-11 DIAGNOSIS — I51.9 MYXEDEMA HEART DISEASE: ICD-10-CM

## 2023-09-11 DIAGNOSIS — E03.9 MYXEDEMA HEART DISEASE: Primary | ICD-10-CM

## 2023-09-11 LAB
T4 FREE SERPL-MCNC: 1.3 NG/DL (ref 0.93–1.7)
TSH SERPL DL<=0.05 MIU/L-ACNC: 1.95 UIU/ML (ref 0.27–4.2)

## 2023-09-11 PROCEDURE — 84439 ASSAY OF FREE THYROXINE: CPT

## 2023-09-11 PROCEDURE — 36415 COLL VENOUS BLD VENIPUNCTURE: CPT

## 2023-09-11 PROCEDURE — 84443 ASSAY THYROID STIM HORMONE: CPT

## 2023-09-13 LAB — REF LAB TEST RESULTS: NORMAL

## 2023-10-31 ENCOUNTER — OFFICE VISIT (OUTPATIENT)
Dept: CARDIOLOGY | Facility: CLINIC | Age: 33
End: 2023-10-31
Payer: COMMERCIAL

## 2023-10-31 VITALS
HEART RATE: 68 BPM | WEIGHT: 177 LBS | BODY MASS INDEX: 30.22 KG/M2 | HEIGHT: 64 IN | DIASTOLIC BLOOD PRESSURE: 70 MMHG | SYSTOLIC BLOOD PRESSURE: 114 MMHG

## 2023-10-31 DIAGNOSIS — R00.0 TACHYCARDIA: Primary | ICD-10-CM

## 2023-10-31 DIAGNOSIS — E11.9 TYPE 2 DIABETES MELLITUS WITHOUT COMPLICATION, WITHOUT LONG-TERM CURRENT USE OF INSULIN: ICD-10-CM

## 2023-10-31 DIAGNOSIS — E03.9 HYPOTHYROIDISM, UNSPECIFIED TYPE: ICD-10-CM

## 2023-10-31 PROCEDURE — 1160F RVW MEDS BY RX/DR IN RCRD: CPT | Performed by: NURSE PRACTITIONER

## 2023-10-31 PROCEDURE — 1159F MED LIST DOCD IN RCRD: CPT | Performed by: NURSE PRACTITIONER

## 2023-10-31 PROCEDURE — 99213 OFFICE O/P EST LOW 20 MIN: CPT | Performed by: NURSE PRACTITIONER

## 2023-10-31 RX ORDER — PRENATAL VIT NO.126/IRON/FOLIC 28MG-0.8MG
1 TABLET ORAL DAILY
COMMUNITY

## 2023-10-31 RX ORDER — LABETALOL 100 MG/1
100 TABLET, FILM COATED ORAL 2 TIMES DAILY
Qty: 180 TABLET | Refills: 3 | Status: SHIPPED | OUTPATIENT
Start: 2023-10-31

## 2023-10-31 RX ORDER — LABETALOL 100 MG/1
100 TABLET, FILM COATED ORAL 2 TIMES DAILY
COMMUNITY
End: 2023-10-31 | Stop reason: SDUPTHER

## 2023-10-31 RX ORDER — INSULIN DETEMIR 100 [IU]/ML
100 INJECTION, SOLUTION SUBCUTANEOUS DAILY PRN
COMMUNITY

## 2023-10-31 NOTE — PROGRESS NOTES
Chief Complaint   Patient presents with    Follow-up     For cardiac management. Patient is on aspirin. Patient is pregnant and due 23 and will having a fetal echo after she has anatomy scan. Is now on labetalol instead of metoprolol. Last lab work was done on 23 and 23 in chart under labs.    Med Refill     Needs refills on labetalol to [facility]. 90 day supplies. Brought medication list with visit.        Robbie Monte is a 33 y.o. female seen in 2023 for initial cardiac consultation.  She has DM, and hypothyroidism followed by specialist in [place].  She had developed episodes of chest pressure and EKG showed some T wave changes therefore referred to emergency room at [employer].  She was informed she had MI and underwent cardiac catheterization.  According to patient no significant CAD noted.  Apparently echocardiogram showed possible pericarditis.  Beta-blocker was stopped otherwise no medication changes noted.  At consultation she admitted to palpitations and heart racing, fatigue, and abdominal discomfort.  Metoprolol XL 25 mg daily was prescribed.  Clinical exam was negative for pericardial rub.  Bentyl was added for possible symptoms of gastroparesis.    Today she returns to the office for follow-up visit.  She is now pregnant with due date 2023.  Beta-blocker was changed to labetalol.  She admits to better control of heart rate and palpitations of metoprolol but labetalol has been beneficial with some mild breakthrough palpitations.  Blood glucose has been stable with lowest 52 and has been prescribed Dexcom which she plans to obtain later today.  Blood pressure remained stable.  She has experienced some mild swelling in her feet and admits she had significant swelling with her first pregnancy.      Cardiac History:    Past Surgical History:   Procedure Laterality Date    CARDIAC CATHETERIZATION  04/15/2023     SECTION      CHOLECYSTECTOMY       ECHO - CONVERTED  04/16/2023    ECHO - CONVERTED  09/07/2023    EF 60%. Trace MR. RVSP- 13 mmHg    EXPLORATORY LAPAROTOMY      HAND SURGERY Right     TONSILLECTOMY AND ADENOIDECTOMY         Current Outpatient Medications   Medication Sig Dispense Refill    aspirin 81 MG EC tablet Take 1 tablet by mouth Daily.      cetirizine (zyrTEC) 10 MG tablet Take 1 tablet by mouth Daily.      doxylamine (UNISOM) 25 MG tablet Take 1 tablet by mouth Every Night.      insulin aspart (novoLOG FLEXPEN) 100 UNIT/ML solution pen-injector sc pen Inject  under the skin into the appropriate area as directed Take As Directed.      insulin detemir (Levemir FlexPen) 100 UNIT/ML injection Inject 100 Units under the skin into the appropriate area as directed Daily As Needed.      labetalol (NORMODYNE) 100 MG tablet Take 1 tablet by mouth 2 (Two) Times a Day. 180 tablet 3    levothyroxine (SYNTHROID, LEVOTHROID) 88 MCG tablet Take 1 tablet by mouth Daily.      MAGNESIUM-ZINC PO Take 200 mg by mouth Daily.      metFORMIN (GLUCOPHAGE) 1000 MG tablet Take 1 tablet by mouth 2 (Two) Times a Day.      prenatal vitamin (prenatal, CLASSIC, vitamin) tablet Take 1 tablet by mouth Daily.      sertraline (ZOLOFT) 50 MG tablet Take 1 tablet by mouth every night at bedtime.       No current facility-administered medications for this visit.       Alpha-gal, Gluten meal, and Sulfa antibiotics    Past Medical History:   Diagnosis Date    Allergy to alpha-gal     Chest pressure     Diabetes mellitus     Gluten intolerance     Hypertension     Hypothyroidism     Myocardial infarction        Social History     Socioeconomic History    Marital status:    Tobacco Use    Smoking status: Never    Smokeless tobacco: Never   Vaping Use    Vaping Use: Never used   Substance and Sexual Activity    Alcohol use: Not Currently    Drug use: Never    Sexual activity: Yes     Partners: Male       Family History   Problem Relation Age of Onset    Hypertension Mother      "Hypertension Father     Hypertension Maternal Grandmother     Diabetes Maternal Grandmother     Hypertension Maternal Grandfather     Diabetes Maternal Grandfather     Heart disease Maternal Grandfather     Cancer Paternal Grandmother     Cancer Paternal Grandfather     No Known Problems Daughter        Review of Systems   Cardiovascular:  Positive for leg swelling (mild) and palpitations. Negative for chest pain, dyspnea on exertion, near-syncope and paroxysmal nocturnal dyspnea.   Respiratory:  Negative for sleep disturbances due to breathing.    Skin:  Negative for color change.   Neurological:  Negative for dizziness and weakness.        BP Readings from Last 5 Encounters:   10/31/23 114/70   09/07/23 116/60   04/19/23 118/80       Wt Readings from Last 5 Encounters:   10/31/23 80.3 kg (177 lb)   09/07/23 78 kg (171 lb 15.3 oz)   04/19/23 78 kg (172 lb)       Objective     /70 (BP Location: Right arm)   Pulse 68   Ht 162 cm (63.78\")   Wt 80.3 kg (177 lb)   LMP 06/30/2023   BMI 30.59 kg/m²     Vitals and nursing note reviewed.   Constitutional:       Appearance: Healthy appearance. Not in distress.   Pulmonary:      Effort: Pulmonary effort is normal.      Breath sounds: Normal breath sounds. No wheezing. No rales.   Cardiovascular:      PMI at left midclavicular line. Regular rhythm.      Murmurs: There is no murmur.   Pulses:     Intact distal pulses.   Edema:     Feet: bilateral trace edema of the feet.  Musculoskeletal:      Cervical back: Neck supple. Neurological:      Mental Status: Alert and oriented to person, place and time.          Procedures: none today          Assessment & Plan   Diagnoses and all orders for this visit:    1. Tachycardia (Primary)    2. Type 2 diabetes mellitus without complication, without long-term current use of insulin    3. Hypothyroidism, unspecified type    Other orders  -     labetalol (NORMODYNE) 100 MG tablet; Take 1 tablet by mouth 2 (Two) Times a Day.  " Dispense: 180 tablet; Refill: 3        Patient is now pregnant with due date April 2023.  Tachycardia/palpitations are managed with labetalol and tolerating well at this time.  Currently heart rate and rhythm normal.  Blood pressure normal.  Continue with current plan of care.    Bentyl has been discontinued due to pregnancy    Patient is now following with OB for management of blood glucose and thyroid.    From a cardiac standpoint she appears stable.  Continue beta-blocker therapy.  We will bring her back in 6 months for follow-up visit.  Please call sooner for cardiac concerns.

## 2023-11-16 ENCOUNTER — HOSPITAL ENCOUNTER (OUTPATIENT)
Dept: WOMENS IMAGING | Facility: HOSPITAL | Age: 33
Discharge: HOME | End: 2023-11-16
Admitting: OBSTETRICS & GYNECOLOGY
Payer: COMMERCIAL

## 2023-11-16 ENCOUNTER — OFFICE VISIT (OUTPATIENT)
Dept: OBSTETRICS AND GYNECOLOGY | Facility: HOSPITAL | Age: 33
End: 2023-11-16
Payer: COMMERCIAL

## 2023-11-16 VITALS
SYSTOLIC BLOOD PRESSURE: 110 MMHG | BODY MASS INDEX: 31.75 KG/M2 | DIASTOLIC BLOOD PRESSURE: 78 MMHG | WEIGHT: 179.2 LBS | HEIGHT: 63 IN

## 2023-11-16 DIAGNOSIS — E11.9 TYPE 2 DIABETES MELLITUS WITHOUT COMPLICATION, WITHOUT LONG-TERM CURRENT USE OF INSULIN: Primary | ICD-10-CM

## 2023-11-16 DIAGNOSIS — O10.919 CHRONIC HYPERTENSION AFFECTING PREGNANCY: ICD-10-CM

## 2023-11-16 DIAGNOSIS — I25.2 HISTORY OF MI (MYOCARDIAL INFARCTION): ICD-10-CM

## 2023-11-16 DIAGNOSIS — Z98.891 HISTORY OF C-SECTION: ICD-10-CM

## 2023-11-16 DIAGNOSIS — O09.522 MULTIGRAVIDA OF ADVANCED MATERNAL AGE IN SECOND TRIMESTER: ICD-10-CM

## 2023-11-16 DIAGNOSIS — I25.2 HISTORY OF ACUTE ANTERIOR WALL MYOCARDIAL INFARCTION: ICD-10-CM

## 2023-11-16 DIAGNOSIS — O09.529 ANTEPARTUM MULTIGRAVIDA OF ADVANCED MATERNAL AGE: ICD-10-CM

## 2023-11-16 DIAGNOSIS — O99.282 HYPOTHYROIDISM AFFECTING PREGNANCY IN SECOND TRIMESTER: ICD-10-CM

## 2023-11-16 DIAGNOSIS — E03.9 HYPOTHYROIDISM AFFECTING PREGNANCY IN SECOND TRIMESTER: ICD-10-CM

## 2023-11-16 DIAGNOSIS — E11.9 TYPE 2 DIABETES MELLITUS WITHOUT COMPLICATION, WITHOUT LONG-TERM CURRENT USE OF INSULIN: ICD-10-CM

## 2023-11-16 PROBLEM — O99.280 HYPOTHYROIDISM AFFECTING PREGNANCY: Status: ACTIVE | Noted: 2023-11-16

## 2023-11-16 PROCEDURE — 76811 OB US DETAILED SNGL FETUS: CPT

## 2023-11-16 RX ORDER — PROCHLORPERAZINE 25 MG/1
SUPPOSITORY RECTAL
COMMUNITY
Start: 2023-10-20

## 2023-11-16 RX ORDER — LANCETS 28 GAUGE
EACH MISCELLANEOUS
COMMUNITY
Start: 2023-10-03

## 2023-11-16 RX ORDER — PROCHLORPERAZINE 25 MG/1
SUPPOSITORY RECTAL
COMMUNITY
Start: 2023-10-30

## 2023-11-16 RX ORDER — BLOOD-GLUCOSE METER
1 KIT MISCELLANEOUS AS NEEDED
COMMUNITY
Start: 2023-09-05

## 2023-11-16 RX ORDER — SERTRALINE HYDROCHLORIDE 100 MG/1
100 TABLET, FILM COATED ORAL DAILY
COMMUNITY

## 2023-11-16 RX ORDER — PEN NEEDLE, DIABETIC 31 GX5/16"
NEEDLE, DISPOSABLE MISCELLANEOUS
COMMUNITY
Start: 2023-09-11

## 2023-11-16 NOTE — ASSESSMENT & PLAN NOTE
Hypothyroidism (new or inadequately treated) complicating pregnancy is associated with a high rate of first trimester spontaneous . Some series place the first trimester loss rate as high as 50-60% even after confirmation of fetal cardiac activity. In continuing pregnancies, hypothyroidism has been associated with an increased risk of several complications, including preeclampsia, placental abruption,  delivery, and fetal growth restriction.  Hypothyroidism also can impair the neuropsychologic development of the fetus.  TSH is monitored during pregnancy to ensure adequate treatment is being undertaken. Monitoring should take place every trimester throughout pregnancy and 4 weeks after a dose change.    Last TSH performed in September was normal.

## 2023-11-16 NOTE — LETTER
"2023       No Recipients    Patient: Renetta Monte   YOB: 1990   Date of Visit: 2023       Dear Brittany Moody MD,    Thank you for referring Renetta Monte to me for evaluation. Below is a copy of my consult note.    If you have questions, please do not hesitate to call me. I look forward to following Renetta along with you.         Sincerely,        Arsh Reis MD        CC:   No Recipients    Pt reports Hx of heart attack due to \"being pushed to the limit at her job\" in May of this year.  Pt reports no longer at this job and doing better. Recentytarted on insulin ,  has dexcom , next f/u with [facility] today, NIPT neg         Maternal/Fetal Medicine Consult Note   Date: 2023  Name: Renetta Monte    : 1990     MRN: [id number]     Referring Provider: Brittany Moody MD    Chief Complaint  AMA, T2DM, Hx of c/s, Hx of MI, CHTN    Subjective     History of Present Illness:  Renetta Monte is a 33 y.o.  19w6d who presents today for anatomy and consultation secondary to her advanced maternal age, chronic hypertension, type 2 diabetes, history of myocardial infarction.    Patient states she feels well today.  Denies contractions, leaking of fluid, vaginal bleeding.    TRISTAN: Estimated Date of Delivery: 24     ROS:   Otherwise Noted in HPI    Past Medical History:   Diagnosis Date   • Chest pressure    • Chronic hypertension     labetalol   • Gluten intolerance    • History of myocardial infarct at age less than 60 years 2023   • Hypothyroidism    • Type 2 diabetes mellitus     on insulin      Past Surgical History:   Procedure Laterality Date   • CARDIAC CATHETERIZATION  04/15/2023   •  SECTION     • CHOLECYSTECTOMY     • ECHO - CONVERTED  2023   • ECHO - CONVERTED  2023    EF 60%. Trace MR. RVSP- 13 mmHg   • EXPLORATORY LAPAROTOMY     • HAND SURGERY Right    • TONSILLECTOMY AND ADENOIDECTOMY        OB History       "    3    Para   1    Term   1       0    AB   1    Living   1         SAB   1    IAB   0    Ectopic   0    Molar   0    Multiple   0    Live Births   1          Obstetric Comments   Fob #1 - Pregnancy #1- #3               Current Outpatient Medications:   •  aspirin 81 MG EC tablet, Take 1 tablet by mouth Daily., Disp: , Rfl:   •  B-D ULTRAFINE III SHORT PEN 31G X 8 MM misc, , Disp: , Rfl:   •  cetirizine (zyrTEC) 10 MG tablet, Take 1 tablet by mouth Daily., Disp: , Rfl:   •  Continuous Blood Gluc  (Dexcom G6 ) device, , Disp: , Rfl:   •  Continuous Blood Gluc Sensor (Dexcom G6 Sensor), , Disp: , Rfl:   •  Continuous Blood Gluc Transmit (Dexcom G6 Transmitter) misc, , Disp: , Rfl:   •  doxylamine (UNISOM) 25 MG tablet, Take 1 tablet by mouth Every Night., Disp: , Rfl:   •  FREESTYLE LITE test strip, 1 each by Other route As Needed., Disp: , Rfl:   •  insulin aspart (novoLOG FLEXPEN) 100 UNIT/ML solution pen-injector sc pen, Inject  under the skin into the appropriate area as directed Take As Directed. Sliding scale 150-180 2 units/  then 2units additional units per each 50 points of blood sugar reading, Disp: , Rfl:   •  insulin detemir (Levemir FlexPen) 100 UNIT/ML injection, Inject 14 Units under the skin into the appropriate area as directed Daily. Morning - unable to take at night due to low blood sugars, Disp: , Rfl:   •  labetalol (NORMODYNE) 100 MG tablet, Take 1 tablet by mouth 2 (Two) Times a Day., Disp: 180 tablet, Rfl: 3  •  Lancets (freestyle) lancets, , Disp: , Rfl:   •  levothyroxine (SYNTHROID, LEVOTHROID) 88 MCG tablet, Take 1 tablet by mouth Daily., Disp: , Rfl:   •  MAGNESIUM-ZINC PO, Take 200 mg by mouth Daily., Disp: , Rfl:   •  metFORMIN (GLUCOPHAGE) 1000 MG tablet, Take 1 tablet by mouth 2 (Two) Times a Day., Disp: , Rfl:   •  prenatal vitamin (prenatal, CLASSIC, vitamin) tablet, Take 1 tablet by mouth Daily., Disp: , Rfl:   •  sertraline (ZOLOFT) 100 MG tablet,  "Take 1 tablet by mouth Daily., Disp: , Rfl:   •  sertraline (ZOLOFT) 50 MG tablet, Take 1 tablet by mouth every night at bedtime., Disp: , Rfl:     Objective     Vital Signs  /78   Ht 160 cm (63\")   Wt 81.3 kg (179 lb 3.2 oz)   LMP 2023   Estimated body mass index is 31.74 kg/m² as calculated from the following:    Height as of this encounter: 160 cm (63\").    Weight as of this encounter: 81.3 kg (179 lb 3.2 oz).    Ultrasound Impression:   See Viewpoint     Assessment and Plan     Renetta Monte is a 33 y.o.  19w6d who presents today for anatomy and consultation secondary to her advanced maternal age, chronic hypertension, type 2 diabetes, history of myocardial infarction.    Diagnoses and all orders for this visit:    1. Type 2 diabetes mellitus without complication, without long-term current use of insulin (Primary)  Assessment & Plan:  Patient currently being managed by primary OB/GYN for her type 2 diabetes.  Patient's last A1c was 6.7.  She previously left her endocrinologist.  She was prescribed 14 units of Levemir at night but she has recently changed this to morning time due to nighttime hypoglycemia.  She is on a sliding scale NovoLog at mealtime.  She also has a Dexcom for monitoring of her glucose values.  We have given patient information and capabilities to send Dexcom data to us for optimization of glucose.  Reviewed glucose log today shows mostly normal fasting glucoses with sporadic increases in postprandials.  Patient does state that she takes her short acting glucose immediately after meals and we discussed proper usage of short acting insulin.    Pregestational diabetics have an 8-10% risk for having a child with a congenital malformation.  The risk is lower if the patient has good glycemic control prior to conception. Women who have a normal hemoglobin A1C, have an incidence of birth defects comparable to the general population risk of 3-5%.  Women whose hemoglobin A1C " "is elevated have the highest risk for fetal malformations and the risk is proportional to the degree of elevation. The most frequent malformations found in infants of diabetic mothers are heart defects. The infants may also have neural tube defects, caudal regression, and limb abnormalities, including femoral hypoplasia.  A periconceptual A1C of >8.5% has been associated with a >20% risk for congenital anomalies.  When the HgbA1C is severely elevated there is also an increased risk of miscarriage.      Today we discussed the risks of diabetes and pregnancy including maternal risks: increasing insulin requirements, operative delivery, preeclampsia, and infection; as well as fetal risks: malformations as discussed above, growth abnormalities, iatrogenic  birth and stillbirth.   morbidities among infants of diabetic mothers include hypoglycemia, hyperbilirubinemia, and metabolic instability.     I have given her our goals for pregnancy glucose control of fasting glucose 95, 2 hr postprandial glucose <120, and 1 hr postprandial glucose <140.  She is going to keep track of these 4 values daily     Recommendations:  -Fetal echocardiogram at 24-26 weeks  -Serial growth assessment (every 4 wks)  - testing: to begin at 32 wks unless indication develops prior.  -Continue weekly glucose log and adjust medications as needed.        Orders:  -     [facility]; Standing    2. History of MI (myocardial infarction)  Assessment & Plan:  Patient currently following with cardiology here for history of possible MI.  Per patient patient had elevated troponins and EKG concerning for heart attack so was taken for surgery though no blockage was found.  Per patient she was told that it was \"broken heart\" but discharge paperwork stated NSTEMI.  Patient had a follow-up echo that was normal.  Patient currently on labetalol for beta-blockade and is currently taking a baby aspirin.  Patient with a " history of MI already increased risk of cardiac issues throughout pregnancy.  We encourage patient to continue following with cardiology and to report any signs of chest pain or shortness of breath.    Orders:  -      [facility]; Standing    3. Multigravida of advanced maternal age in second trimester  Assessment & Plan:  Patient will be 33 at time of delivery which classifies patient as advanced maternal age. Patient has previously had NIPT drawn and is no increased risk. Today we discussed that her risk of having a child with aneuploidy is now 1 in 14106. We discussed the limitations of NIPT and that it is a screening testing only for chromosome 21, 13, 18 and sex chromosomes.  Today's ultrasound shows no markers for aneuploidy.    Orders:  -      [facility] [place]; Standing    4. Chronic hypertension affecting pregnancy  Assessment & Plan:  Patient currently on labetalol 100 mg twice daily.  Today's blood pressure was 110/78.    Pt has a diagnosis of chronic hypertension.  Initial evaluation in these women should include renal function by 24 hr urine for protein and creatinine clearance.  HTN is associated with Fetal Growth Restriction (FGR) in about 15-25% of cases. HTN progresses to superimposed preeclampsia in about 25-35% of cases, usually recognized by the appearance of proteinuria & worsening hypertension.  These interventions do not decrease the risk of superimposed preeclampsia: prophylactic bedrest, work & activity restrictions, dietary changes, nutrient supplements, or prophylactic antihypertensive therapy.      The only proven prevention strategy for preeclampsia is low dose aspirin during pregnancy.   In fact, the USPTF recommends low dose aspirin initiation after 12 wks gestation for preeclampsia prevention in these women.  We have instructed patient to check a blood pressure log and to report BPs 160/110s. With recent publishing of the CHAP trial, the goal for  mild chronic hypertension is blood pressures <140/80. This trial showed decreased maternal morbidity with no change in  morbidity.    Recommendations:  -Currently on low dose aspirin  -Baseline labs: normal urine protein creatinine ratio and normal serum preeclampsia labs  -Monthly growth ultrasounds     - testing at 32 wks unless indication develops prior (usually with twice weekly NST's)       Orders:  -     [facility]; Standing    5. Hypothyroidism affecting pregnancy in second trimester  Assessment & Plan:  Hypothyroidism (new or inadequately treated) complicating pregnancy is associated with a high rate of first trimester spontaneous . Some series place the first trimester loss rate as high as 50-60% even after confirmation of fetal cardiac activity. In continuing pregnancies, hypothyroidism has been associated with an increased risk of several complications, including preeclampsia, placental abruption,  delivery, and fetal growth restriction.  Hypothyroidism also can impair the neuropsychologic development of the fetus.  TSH is monitored during pregnancy to ensure adequate treatment is being undertaken. Monitoring should take place every trimester throughout pregnancy and 4 weeks after a dose change.    Last TSH performed in September was normal.      Orders:  -     [facility]; Standing         Follow Up  Patient will follow-up in 4 weeks for fetal echo and growth ultrasound.    I spent 45 minutes caring for the patient on the day of service. This included: obtaining or reviewing a separately obtained medical history, reviewing patient records, performing a medically appropriate exam and/or evaluation, counseling or educating the patient/family/caregiver, ordering medications, labs, and/or procedures and documenting such in the medical record. This does not include time spent on review and interpretation of other tests such as  fetal ultrasound or the performance of other procedures such as amniocentesis or CVS.      Arsh Reis MD, FACOG  Maternal Fetal Medicine, [facility] [place]

## 2023-11-16 NOTE — ASSESSMENT & PLAN NOTE
Patient currently being managed by primary OB/GYN for her type 2 diabetes.  Patient's last A1c was 6.7.  She previously left her endocrinologist.  She was prescribed 14 units of Levemir at night but she has recently changed this to morning time due to nighttime hypoglycemia.  She is on a sliding scale NovoLog at mealtime.  She also has a Dexcom for monitoring of her glucose values.  We have given patient information and capabilities to send Dexcom data to us for optimization of glucose.  Reviewed glucose log today shows mostly normal fasting glucoses with sporadic increases in postprandials.  Patient does state that she takes her short acting glucose immediately after meals and we discussed proper usage of short acting insulin.    Pregestational diabetics have an 8-10% risk for having a child with a congenital malformation.  The risk is lower if the patient has good glycemic control prior to conception. Women who have a normal hemoglobin A1C, have an incidence of birth defects comparable to the general population risk of 3-5%.  Women whose hemoglobin A1C is elevated have the highest risk for fetal malformations and the risk is proportional to the degree of elevation. The most frequent malformations found in infants of diabetic mothers are heart defects. The infants may also have neural tube defects, caudal regression, and limb abnormalities, including femoral hypoplasia.  A periconceptual A1C of >8.5% has been associated with a >20% risk for congenital anomalies.  When the HgbA1C is severely elevated there is also an increased risk of miscarriage.      Today we discussed the risks of diabetes and pregnancy including maternal risks: increasing insulin requirements, operative delivery, preeclampsia, and infection; as well as fetal risks: malformations as discussed above, growth abnormalities, iatrogenic  birth and stillbirth.   morbidities among infants of diabetic mothers include hypoglycemia,  hyperbilirubinemia, and metabolic instability.     I have given her our goals for pregnancy glucose control of fasting glucose 95, 2 hr postprandial glucose <120, and 1 hr postprandial glucose <140.  She is going to keep track of these 4 values daily     Recommendations:  -Fetal echocardiogram at 24-26 weeks  -Serial growth assessment (every 4 wks)  - testing: to begin at 32 wks unless indication develops prior.  -Continue weekly glucose log and adjust medications as needed.

## 2023-11-16 NOTE — PROGRESS NOTES
"Pt reports Hx of heart attack due to \"being pushed to the limit at her job\" in May of this year.  Pt reports no longer at this job and doing better. Recentytarted on insulin ,  has dexcom , next f/u with [facility] today, NIPT neg   "

## 2023-11-16 NOTE — ASSESSMENT & PLAN NOTE
Patient currently on labetalol 100 mg twice daily.  Today's blood pressure was 110/78.    Pt has a diagnosis of chronic hypertension.  Initial evaluation in these women should include renal function by 24 hr urine for protein and creatinine clearance.  HTN is associated with Fetal Growth Restriction (FGR) in about 15-25% of cases. HTN progresses to superimposed preeclampsia in about 25-35% of cases, usually recognized by the appearance of proteinuria & worsening hypertension.  These interventions do not decrease the risk of superimposed preeclampsia: prophylactic bedrest, work & activity restrictions, dietary changes, nutrient supplements, or prophylactic antihypertensive therapy.      The only proven prevention strategy for preeclampsia is low dose aspirin during pregnancy.   In fact, the USPTF recommends low dose aspirin initiation after 12 wks gestation for preeclampsia prevention in these women.  We have instructed patient to check a blood pressure log and to report BPs 160/110s. With recent publishing of the CHAP trial, the goal for mild chronic hypertension is blood pressures <140/80. This trial showed decreased maternal morbidity with no change in  morbidity.    Recommendations:  -Currently on low dose aspirin  -Baseline labs: normal urine protein creatinine ratio and normal serum preeclampsia labs  -Monthly growth ultrasounds     - testing at 32 wks unless indication develops prior (usually with twice weekly NST's)

## 2023-11-16 NOTE — PROGRESS NOTES
Maternal/Fetal Medicine Consult Note   Date: 2023  Name: Renetta Monte    : 1990     MRN: [id number]     Referring Provider: Brittany Moody MD    Chief Complaint  AMA, T2DM, Hx of c/s, Hx of MI, CHTN    Subjective     History of Present Illness:  Renetta Monte is a 33 y.o.  19w6d who presents today for anatomy and consultation secondary to her advanced maternal age, chronic hypertension, type 2 diabetes, history of myocardial infarction.    Patient states she feels well today.  Denies contractions, leaking of fluid, vaginal bleeding.    TRISTAN: Estimated Date of Delivery: 24     ROS:   Otherwise Noted in HPI    Past Medical History:   Diagnosis Date    Chest pressure     Chronic hypertension     labetalol    Gluten intolerance     History of myocardial infarct at age less than 60 years 2023    Hypothyroidism     Type 2 diabetes mellitus 2009    on insulin      Past Surgical History:   Procedure Laterality Date    CARDIAC CATHETERIZATION  04/15/2023     SECTION      CHOLECYSTECTOMY      ECHO - CONVERTED  2023    ECHO - CONVERTED  2023    EF 60%. Trace  RVSP- 13 mmHg    EXPLORATORY LAPAROTOMY      HAND SURGERY Right     TONSILLECTOMY AND ADENOIDECTOMY        OB History          3    Para   1    Term   1       0    AB   1    Living   1         SAB   1    IAB   0    Ectopic   0    Molar   0    Multiple   0    Live Births   1          Obstetric Comments   Fob #1 - Pregnancy #1- #3               Current Outpatient Medications:     aspirin 81 MG EC tablet, Take 1 tablet by mouth Daily., Disp: , Rfl:     B-D ULTRAFINE III SHORT PEN 31G X 8 MM misc, , Disp: , Rfl:     cetirizine (zyrTEC) 10 MG tablet, Take 1 tablet by mouth Daily., Disp: , Rfl:     Continuous Blood Gluc  (Dexcom G6 ) device, , Disp: , Rfl:     Continuous Blood Gluc Sensor (Dexcom G6 Sensor), , Disp: , Rfl:     Continuous Blood Gluc Transmit (Dexcom G6  "Transmitter) misc, , Disp: , Rfl:     doxylamine (UNISOM) 25 MG tablet, Take 1 tablet by mouth Every Night., Disp: , Rfl:     FREESTYLE LITE test strip, 1 each by Other route As Needed., Disp: , Rfl:     insulin aspart (novoLOG FLEXPEN) 100 UNIT/ML solution pen-injector sc pen, Inject  under the skin into the appropriate area as directed Take As Directed. Sliding scale 150-180 2 units/  then 2units additional units per each 50 points of blood sugar reading, Disp: , Rfl:     insulin detemir (Levemir FlexPen) 100 UNIT/ML injection, Inject 14 Units under the skin into the appropriate area as directed Daily. Morning - unable to take at night due to low blood sugars, Disp: , Rfl:     labetalol (NORMODYNE) 100 MG tablet, Take 1 tablet by mouth 2 (Two) Times a Day., Disp: 180 tablet, Rfl: 3    Lancets (freestyle) lancets, , Disp: , Rfl:     levothyroxine (SYNTHROID, LEVOTHROID) 88 MCG tablet, Take 1 tablet by mouth Daily., Disp: , Rfl:     MAGNESIUM-ZINC PO, Take 200 mg by mouth Daily., Disp: , Rfl:     metFORMIN (GLUCOPHAGE) 1000 MG tablet, Take 1 tablet by mouth 2 (Two) Times a Day., Disp: , Rfl:     prenatal vitamin (prenatal, CLASSIC, vitamin) tablet, Take 1 tablet by mouth Daily., Disp: , Rfl:     sertraline (ZOLOFT) 100 MG tablet, Take 1 tablet by mouth Daily., Disp: , Rfl:     sertraline (ZOLOFT) 50 MG tablet, Take 1 tablet by mouth every night at bedtime., Disp: , Rfl:     Objective     Vital Signs  /78   Ht 160 cm (63\")   Wt 81.3 kg (179 lb 3.2 oz)   LMP 2023   Estimated body mass index is 31.74 kg/m² as calculated from the following:    Height as of this encounter: 160 cm (63\").    Weight as of this encounter: 81.3 kg (179 lb 3.2 oz).    Ultrasound Impression:   See Viewpoint     Assessment and Plan     Renetta Monte is a 33 y.o.  19w6d who presents today for anatomy and consultation secondary to her advanced maternal age, chronic hypertension, type 2 diabetes, history of myocardial " infarction.    Diagnoses and all orders for this visit:    1. Type 2 diabetes mellitus without complication, without long-term current use of insulin (Primary)  Assessment & Plan:  Patient currently being managed by primary OB/GYN for her type 2 diabetes.  Patient's last A1c was 6.7.  She previously left her endocrinologist.  She was prescribed 14 units of Levemir at night but she has recently changed this to morning time due to nighttime hypoglycemia.  She is on a sliding scale NovoLog at mealtime.  She also has a Dexcom for monitoring of her glucose values.  We have given patient information and capabilities to send Dexcom data to us for optimization of glucose.  Reviewed glucose log today shows mostly normal fasting glucoses with sporadic increases in postprandials.  Patient does state that she takes her short acting glucose immediately after meals and we discussed proper usage of short acting insulin.    Pregestational diabetics have an 8-10% risk for having a child with a congenital malformation.  The risk is lower if the patient has good glycemic control prior to conception. Women who have a normal hemoglobin A1C, have an incidence of birth defects comparable to the general population risk of 3-5%.  Women whose hemoglobin A1C is elevated have the highest risk for fetal malformations and the risk is proportional to the degree of elevation. The most frequent malformations found in infants of diabetic mothers are heart defects. The infants may also have neural tube defects, caudal regression, and limb abnormalities, including femoral hypoplasia.  A periconceptual A1C of >8.5% has been associated with a >20% risk for congenital anomalies.  When the HgbA1C is severely elevated there is also an increased risk of miscarriage.      Today we discussed the risks of diabetes and pregnancy including maternal risks: increasing insulin requirements, operative delivery, preeclampsia, and infection; as well as fetal risks:  "malformations as discussed above, growth abnormalities, iatrogenic  birth and stillbirth.   morbidities among infants of diabetic mothers include hypoglycemia, hyperbilirubinemia, and metabolic instability.     I have given her our goals for pregnancy glucose control of fasting glucose 95, 2 hr postprandial glucose <120, and 1 hr postprandial glucose <140.  She is going to keep track of these 4 values daily     Recommendations:  -Fetal echocardiogram at 24-26 weeks  -Serial growth assessment (every 4 wks)  - testing: to begin at 32 wks unless indication develops prior.  -Continue weekly glucose log and adjust medications as needed.        Orders:  -      "[employer]" [facility] [place]; Standing    2. History of MI (myocardial infarction)  Assessment & Plan:  Patient currently following with cardiology here for history of possible MI.  Per patient patient had elevated troponins and EKG concerning for heart attack so was taken for surgery though no blockage was found.  Per patient she was told that it was \"broken heart\" but discharge paperwork stated NSTEMI.  Patient had a follow-up echo that was normal.  Patient currently on labetalol for beta-blockade and is currently taking a baby aspirin.  Patient with a history of MI already increased risk of cardiac issues throughout pregnancy.  We encourage patient to continue following with cardiology and to report any signs of chest pain or shortness of breath.    Orders:  -      "[employer]" [facility]; Standing    3. Multigravida of advanced maternal age in second trimester  Assessment & Plan:  Patient will be 33 at time of delivery which classifies patient as advanced maternal age. Patient has previously had NIPT drawn and is no increased risk. Today we discussed that her risk of having a child with aneuploidy is now 1 in 89632. We discussed the limitations of NIPT and that it is a screening testing only for chromosome 21, 13, 18 and " sex chromosomes.  Today's ultrasound shows no markers for aneuploidy.    Orders:  -      [facility]; Standing    4. Chronic hypertension affecting pregnancy  Assessment & Plan:  Patient currently on labetalol 100 mg twice daily.  Today's blood pressure was 110/78.    Pt has a diagnosis of chronic hypertension.  Initial evaluation in these women should include renal function by 24 hr urine for protein and creatinine clearance.  HTN is associated with Fetal Growth Restriction (FGR) in about 15-25% of cases. HTN progresses to superimposed preeclampsia in about 25-35% of cases, usually recognized by the appearance of proteinuria & worsening hypertension.  These interventions do not decrease the risk of superimposed preeclampsia: prophylactic bedrest, work & activity restrictions, dietary changes, nutrient supplements, or prophylactic antihypertensive therapy.      The only proven prevention strategy for preeclampsia is low dose aspirin during pregnancy.   In fact, the USPTF recommends low dose aspirin initiation after 12 wks gestation for preeclampsia prevention in these women.  We have instructed patient to check a blood pressure log and to report BPs 160/110s. With recent publishing of the CHAP trial, the goal for mild chronic hypertension is blood pressures <140/80. This trial showed decreased maternal morbidity with no change in  morbidity.    Recommendations:  -Currently on low dose aspirin  -Baseline labs: normal urine protein creatinine ratio and normal serum preeclampsia labs  -Monthly growth ultrasounds     - testing at 32 wks unless indication develops prior (usually with twice weekly NST's)       Orders:  -      [facility] [place]; Standing    5. Hypothyroidism affecting pregnancy in second trimester  Assessment & Plan:  Hypothyroidism (new or inadequately treated) complicating pregnancy is associated with a high rate of first trimester spontaneous  . Some series place the first trimester loss rate as high as 50-60% even after confirmation of fetal cardiac activity. In continuing pregnancies, hypothyroidism has been associated with an increased risk of several complications, including preeclampsia, placental abruption,  delivery, and fetal growth restriction.  Hypothyroidism also can impair the neuropsychologic development of the fetus.  TSH is monitored during pregnancy to ensure adequate treatment is being undertaken. Monitoring should take place every trimester throughout pregnancy and 4 weeks after a dose change.    Last TSH performed in September was normal.      Orders:  -     [facility]; Standing         Follow Up  Patient will follow-up in 4 weeks for fetal echo and growth ultrasound.    I spent 45 minutes caring for the patient on the day of service. This included: obtaining or reviewing a separately obtained medical history, reviewing patient records, performing a medically appropriate exam and/or evaluation, counseling or educating the patient/family/caregiver, ordering medications, labs, and/or procedures and documenting such in the medical record. This does not include time spent on review and interpretation of other tests such as fetal ultrasound or the performance of other procedures such as amniocentesis or CVS.      Arsh Reis MD, FACOG  Maternal Fetal Medicine, [facility]

## 2023-11-16 NOTE — ASSESSMENT & PLAN NOTE
Patient will be 33 at time of delivery which classifies patient as advanced maternal age. Patient has previously had NIPT drawn and is no increased risk. Today we discussed that her risk of having a child with aneuploidy is now 1 in 11600. We discussed the limitations of NIPT and that it is a screening testing only for chromosome 21, 13, 18 and sex chromosomes.  Today's ultrasound shows no markers for aneuploidy.

## 2023-11-16 NOTE — ASSESSMENT & PLAN NOTE
"Patient currently following with cardiology here for history of possible MI.  Per patient patient had elevated troponins and EKG concerning for heart attack so was taken for surgery though no blockage was found.  Per patient she was told that it was \"broken heart\" but discharge paperwork stated NSTEMI.  Patient had a follow-up echo that was normal.  Patient currently on labetalol for beta-blockade and is currently taking a baby aspirin.  Patient with a history of MI already increased risk of cardiac issues throughout pregnancy.  We encourage patient to continue following with cardiology and to report any signs of chest pain or shortness of breath.  "

## 2023-11-20 ENCOUNTER — TELEPHONE (OUTPATIENT)
Dept: OBSTETRICS AND GYNECOLOGY | Facility: HOSPITAL | Age: 33
End: 2023-11-20
Payer: COMMERCIAL

## 2023-11-20 NOTE — TELEPHONE ENCOUNTER
Pt called has her Dex com setup and working, if you are unable to recieve the data please call her. Thank you

## 2023-11-20 NOTE — TELEPHONE ENCOUNTER
Patient had returned call but left message.  Returned patients call and spoke with patient over the phone.  Patient still having difficulty with her data displaying.  Patient states she just initiated her dexcom last evening and has paired it with her phone.  Patient gave a second sharing code of [id number].  Attempted to run report but continues to state patient has not uploaded her account.  Patient in the interim is keeping a paper log, will accept invite to [employer] and will call or send in log tomorrow.  Mariya Mandel RN

## 2023-11-20 NOTE — TELEPHONE ENCOUNTER
Patient had called stating she has her dexcom up and going.  Attempted to pull report but states no data available.  Called patient and received voice mail.  Left message regarding not receiving data yet.  Enstructed patient to keep a paper log, we will attempt to run a report again tomorrow, if we are unable to get data we will call patient tomorrow to have her send in paper log or to call in her blood sugar log.  Mariya Mandel RN

## 2023-11-22 ENCOUNTER — TELEPHONE (OUTPATIENT)
Dept: OBSTETRICS AND GYNECOLOGY | Facility: HOSPITAL | Age: 33
End: 2023-11-22
Payer: COMMERCIAL

## 2023-11-22 DIAGNOSIS — E11.9 TYPE 2 DIABETES MELLITUS WITHOUT COMPLICATION, WITHOUT LONG-TERM CURRENT USE OF INSULIN: Primary | ICD-10-CM

## 2023-11-22 NOTE — TELEPHONE ENCOUNTER
Spoke with patient over the phone.  Informed patient that Dr. Alexandre has reviewed her blood sugar log and is making the following change.  Novolog 4 units 15-30 units before each meal.  Keep the Levemir and the Metformin as she has been.  Requested patient send in another paper log on Monday and to be sure to write down what she has eaten when she has a high blood glucose.  Patient voices understanding.  Informed patient we still can not pull up data on her dexcom and suggested she reach out to Dexcom technical support.  Patient voices understanding.  Mariya Mandel RN

## 2023-11-27 ENCOUNTER — TELEPHONE (OUTPATIENT)
Dept: OBSTETRICS AND GYNECOLOGY | Facility: HOSPITAL | Age: 33
End: 2023-11-27
Payer: COMMERCIAL

## 2023-11-29 ENCOUNTER — TELEPHONE (OUTPATIENT)
Dept: OBSTETRICS AND GYNECOLOGY | Facility: HOSPITAL | Age: 33
End: 2023-11-29
Payer: COMMERCIAL

## 2023-11-29 ENCOUNTER — DOCUMENTATION (OUTPATIENT)
Dept: DIABETES SERVICES | Facility: HOSPITAL | Age: 33
End: 2023-11-29
Payer: COMMERCIAL

## 2023-11-29 ENCOUNTER — HOSPITAL ENCOUNTER (OUTPATIENT)
Dept: DIABETES SERVICES | Facility: HOSPITAL | Age: 33
Setting detail: RECURRING SERIES
Discharge: HOME | End: 2023-11-29
Payer: COMMERCIAL

## 2023-11-29 PROCEDURE — G0108 DIAB MANAGE TRN  PER INDIV: HCPCS | Performed by: REGISTERED NURSE

## 2023-11-29 NOTE — TELEPHONE ENCOUNTER
Received call from patient asking if we had a dexcom sensor she could have.  Patient states her sensor is expiring today and her pharmacy will not refill her prescription because insurance is telling them they need a prior authorization.  Patient is concerned because she states she has had several blood sugars recorded in the 50's at night.  Patient continues she has had a prior authorization approved that is good for one year.  Patient has contacted Dr. Carlin's office regarding this since they wrote the original order but they do not have any sensor's either.  Instructed patient to call her insurance company and explain this in detail and to let them know she has had several hypoglycemic events in the middle of the night.  Patient voices understanding and states she will call her insurance company,.  Mariya Mandel RN

## 2023-11-30 NOTE — PLAN OF CARE
60 minutes of problem focused outpatient diabetes education was completed. Patients specific needs were addressed regarding Dexcom and ability to share data. She will be scheduled for a follow up and supported as needed. Thank you for this opportunity.

## 2023-12-06 ENCOUNTER — TELEPHONE (OUTPATIENT)
Dept: OBSTETRICS AND GYNECOLOGY | Facility: HOSPITAL | Age: 33
End: 2023-12-06
Payer: COMMERCIAL

## 2023-12-06 NOTE — TELEPHONE ENCOUNTER
"Spoke with Ms Monte, Informed of Dr Alexandre reviewed blood sugar log and does not want to make any changes to meds at this itme.  Dr Alexandre wants pt to record what she is eating to better understand what is working and what is not.  \"Some meals look good and others don't.  Ms Monte states \"I spoke with dexcom today about you guys not being able to pull my dexcom records\". Ms Monte reports that all of her information is good on her side and we should be able to pull log.  Dexcom has requested we call then to try to sort problem out.    Ms Monte instructed to send in logs next week, vu given   "

## 2023-12-11 ENCOUNTER — TELEPHONE (OUTPATIENT)
Dept: OBSTETRICS AND GYNECOLOGY | Facility: HOSPITAL | Age: 33
End: 2023-12-11
Payer: COMMERCIAL

## 2023-12-11 NOTE — TELEPHONE ENCOUNTER
Spoke with patient about carbohydrates and better food choices.  Pt reports she has a gluten allergy and that why she cannot do whole grain bread. Will send pt list of food with serving size and values.  Pt supplied e mail address [email or web address]

## 2023-12-11 NOTE — TELEPHONE ENCOUNTER
"Spoke with patient about her message she sent in.  Pt reports that her blood sugar was 31 after her shower yesterday and she \"felt like her world was going dark\".  Pt reports she layed down and her  got her diet pepper to drink and her blood sugar came up slowly.    Pt reports she never got above 140 on her blood sugar all weekend.  Printed dexcom report.  Advised patient on carb ratios for meals.  Pt with vu,  had recent diabetic teaching .  Pt with vu of poc.  Will submit food log and report to Dr Lopes   "

## 2023-12-20 ENCOUNTER — OFFICE VISIT (OUTPATIENT)
Dept: OBSTETRICS AND GYNECOLOGY | Facility: HOSPITAL | Age: 33
End: 2023-12-20
Payer: COMMERCIAL

## 2023-12-20 ENCOUNTER — HOSPITAL ENCOUNTER (OUTPATIENT)
Dept: WOMENS IMAGING | Facility: HOSPITAL | Age: 33
Discharge: HOME | End: 2023-12-20
Admitting: OBSTETRICS & GYNECOLOGY
Payer: COMMERCIAL

## 2023-12-20 VITALS — WEIGHT: 184 LBS | DIASTOLIC BLOOD PRESSURE: 72 MMHG | BODY MASS INDEX: 32.59 KG/M2 | SYSTOLIC BLOOD PRESSURE: 104 MMHG

## 2023-12-20 DIAGNOSIS — Z3A.24 24 WEEKS GESTATION OF PREGNANCY: ICD-10-CM

## 2023-12-20 DIAGNOSIS — I25.2 HISTORY OF MI (MYOCARDIAL INFARCTION): Primary | ICD-10-CM

## 2023-12-20 DIAGNOSIS — E11.9 TYPE 2 DIABETES MELLITUS WITHOUT COMPLICATION, WITHOUT LONG-TERM CURRENT USE OF INSULIN: ICD-10-CM

## 2023-12-20 DIAGNOSIS — O10.919 CHRONIC HYPERTENSION AFFECTING PREGNANCY: ICD-10-CM

## 2023-12-20 DIAGNOSIS — J32.9 SINUSITIS, UNSPECIFIED CHRONICITY, UNSPECIFIED LOCATION: ICD-10-CM

## 2023-12-20 DIAGNOSIS — O09.522 MULTIGRAVIDA OF ADVANCED MATERNAL AGE IN SECOND TRIMESTER: ICD-10-CM

## 2023-12-20 DIAGNOSIS — E03.9 HYPOTHYROIDISM AFFECTING PREGNANCY IN SECOND TRIMESTER: ICD-10-CM

## 2023-12-20 DIAGNOSIS — O99.282 HYPOTHYROIDISM AFFECTING PREGNANCY IN SECOND TRIMESTER: ICD-10-CM

## 2023-12-20 PROCEDURE — 76825 ECHO EXAM OF FETAL HEART: CPT

## 2023-12-20 PROCEDURE — 76816 OB US FOLLOW-UP PER FETUS: CPT

## 2023-12-20 PROCEDURE — 93325 DOPPLER ECHO COLOR FLOW MAPG: CPT

## 2023-12-20 RX ORDER — AMOXICILLIN AND CLAVULANATE POTASSIUM 875; 125 MG/1; MG/1
1 TABLET, FILM COATED ORAL 2 TIMES DAILY
Qty: 14 TABLET | Refills: 0 | Status: SHIPPED | OUTPATIENT
Start: 2023-12-20

## 2023-12-20 NOTE — PROGRESS NOTES
"    Maternal/Fetal Medicine Consult Note     Name: Renetta Monte    : 1990     MRN: [id number]     Referring Provider: Brittany Moody MD    Chief Complaint  T2DM, AMA, CHTN, h/o MI, h/o c/s, hypothyroidism    Subjective     History of Present Illness:  Renetta Monte is a 33 y.o.  24w5d who presents today for a follow-up ultrasound to assess interval growth and for fetal echo. Patient has been sick with congestion, drainage, bloody nasal congestion for several days. She is s/p a Z-pack without improvement. She is currently taking Mucinex.     She denies LOF/VB/ctx's. ++FM.     TRISTAN: Estimated Date of Delivery: 24     ROS:   As noted in HPI.     Past Medical History:   Diagnosis Date    Chest pressure     Chronic hypertension     labetalol    Gluten intolerance     History of myocardial infarct at age less than 60 years 2023    Hypothyroidism     Type 2 diabetes mellitus 2009    on insulin      Past Surgical History:   Procedure Laterality Date    CARDIAC CATHETERIZATION  04/15/2023     SECTION      CHOLECYSTECTOMY      ECHO - CONVERTED  2023    ECHO - CONVERTED  2023    EF 60%. Trace MR. RVSP- 13 mmHg    EXPLORATORY LAPAROTOMY      HAND SURGERY Right     TONSILLECTOMY AND ADENOIDECTOMY        OB History          3    Para   1    Term   1       0    AB   1    Living   1         SAB   1    IAB   0    Ectopic   0    Molar   0    Multiple   0    Live Births   1          Obstetric Comments   Fob #1 - Pregnancy #1- #3               Objective     Vital Signs  /72   Wt 83.5 kg (184 lb)   LMP 2023   Estimated body mass index is 32.59 kg/m² as calculated from the following:    Height as of 23: 160 cm (63\").    Weight as of this encounter: 83.5 kg (184 lb).    Physical Exam  Constitutional:       Appearance: Normal appearance. She is normal weight.   HENT:      Head: Normocephalic and atraumatic.   Pulmonary:      Effort: Pulmonary effort " is normal.   Musculoskeletal:         General: Normal range of motion.   Neurological:      General: No focal deficit present.      Mental Status: She is alert and oriented to person, place, and time.   Psychiatric:         Mood and Affect: Mood normal.         Behavior: Behavior normal.         Thought Content: Thought content normal.         Judgment: Judgment normal.       Ultrasound Impression:   Breech, S=D, anterior placenta, nl ADOLFO, nl CL, nl anatomy, nl echo. Lacking arch views.    Assessment and Plan     Diagnoses and all orders for this visit:    1. History of MI (myocardial infarction) (Primary)  Assessment & Plan:  S/p normal maternal echo. Remains on ASA and Labetalol. Currently asymptomatic.     Orders:  -      [facility] [place]; Future    2. Multigravida of advanced maternal age in second trimester  Assessment & Plan:  S/p negative NIPT and normal anatomy survey.     Orders:  -      [facility]; Future    3. Type 2 diabetes mellitus without complication, without long-term current use of insulin  Assessment & Plan:  Adjustments in insulin dosing made as per nursing note.    - We will continue to follow weekly and make adjustments as needed    Orders:  -     [facility]; Future    4. Chronic hypertension affecting pregnancy  Assessment & Plan:  Currently on Labetalol 100mgs BID and daily ASA 81mgs. BP today is 104/72.   S/p normal baseline labs.    - Continue q 4 wk growth ultrasounds    Orders:  -     [facility] [place]; Future    5. Sinusitis, unspecified chronicity, unspecified location  -     amoxicillin-clavulanate (AUGMENTIN) 875-125 MG per tablet; Take 1 tablet by mouth 2 (Two) Times a Day.  Dispense: 14 tablet; Refill: 0  -     [facility] [place]; Future    6. Hypothyroidism affecting pregnancy in second trimester  -     [facility] [place]; Future    7. 24 weeks gestation of pregnancy  -      [facility]; Future         Follow Up  Return in about 4 weeks (around 2024).    I spent 30 minutes caring for the patient on the day of service. This included: obtaining or reviewing a separately obtained medical history, reviewing patient records, performing a medically appropriate exam and/or evaluation, counseling or educating the patient/family/caregiver, ordering medications, labs, and/or procedures and documenting such in the medical record. This does not include time spent on review and interpretation of other tests such as fetal ultrasound or the performance of other procedures such as amniocentesis or CVS.      Cony Alexandre MD  2023

## 2023-12-20 NOTE — ASSESSMENT & PLAN NOTE
Adjustments in insulin dosing made as per nursing note.    - We will continue to follow weekly and make adjustments as needed  
Currently on Labetalol 100mgs BID and daily ASA 81mgs. BP today is 104/72.   S/p normal baseline labs.    - Continue q 4 wk growth ultrasounds  
S/p negative NIPT and normal anatomy survey.   
S/p normal maternal echo. Remains on ASA and Labetalol. Currently asymptomatic.   
Pulses equal bilaterally, no edema present.

## 2023-12-20 NOTE — PROGRESS NOTES
Patient denies bleeding, leaking fluid or contractions  Patient does complain, of coughing, drainage, no appetite,  Temp is 98.6, pulse 95  Patient states she has been on zithromycin with no results  NIPT negative  Patients next follow up with Dr. Moody's office is today

## 2023-12-20 NOTE — LETTER
2023     Brittany Moody MD  [address] [facility] [address]  Formerly [facility] [address]    Patient: Renetta Monte   YOB: 1990   Date of Visit: 2023       Dear Brittany Moody MD    Renetta Monte was in my office today. Below is a copy of my note.    If you have questions, please do not hesitate to call me. I look forward to following Renetta along with you.         Sincerely,        Cony Alexandre MD        CC: No Recipients                    Maternal/Fetal Medicine Consult Note     Name: Renetta Monte    : 1990     MRN: [id number]     Referring Provider: Brittany Moody MD    Chief Complaint  T2DM, AMA, CHTN, h/o MI, h/o c/s, hypothyroidism    Subjective     History of Present Illness:  Renetta Monte is a 33 y.o.  24w5d who presents today for a follow-up ultrasound to assess interval growth and for fetal echo. Patient has been sick with congestion, drainage, bloody nasal congestion for several days. She is s/p a Z-pack without improvement. She is currently taking Mucinex.     She denies LOF/VB/ctx's. ++FM.     TRISTAN: Estimated Date of Delivery: 24     ROS:   As noted in HPI.     Past Medical History:   Diagnosis Date   • Chest pressure    • Chronic hypertension     labetalol   • Gluten intolerance    • History of myocardial infarct at age less than 60 years 2023   • Hypothyroidism    • Type 2 diabetes mellitus     on insulin      Past Surgical History:   Procedure Laterality Date   • CARDIAC CATHETERIZATION  04/15/2023   •  SECTION     • CHOLECYSTECTOMY     • ECHO - CONVERTED  2023   • ECHO - CONVERTED  2023    EF 60%. Trace MR. RVSP- 13 mmHg   • EXPLORATORY LAPAROTOMY     • HAND SURGERY Right    • TONSILLECTOMY AND ADENOIDECTOMY        OB History          3    Para   1    Term   1       0    AB   1    Living   1         SAB   1    IAB   0    Ectopic   0    Molar   0    Multiple   0    Live Births   1     "      Obstetric Comments   Fob #1 - Pregnancy #1- #3               Objective     Vital Signs  /72   Wt 83.5 kg (184 lb)   LMP 2023   Estimated body mass index is 32.59 kg/m² as calculated from the following:    Height as of 23: 160 cm (63\").    Weight as of this encounter: 83.5 kg (184 lb).    Physical Exam  Constitutional:       Appearance: Normal appearance. She is normal weight.   HENT:      Head: Normocephalic and atraumatic.   Pulmonary:      Effort: Pulmonary effort is normal.   Musculoskeletal:         General: Normal range of motion.   Neurological:      General: No focal deficit present.      Mental Status: She is alert and oriented to person, place, and time.   Psychiatric:         Mood and Affect: Mood normal.         Behavior: Behavior normal.         Thought Content: Thought content normal.         Judgment: Judgment normal.       Ultrasound Impression:   Breech, S=D, anterior placenta, nl ADOLFO, nl CL, nl anatomy, nl echo. Lacking arch views.    Assessment and Plan     Diagnoses and all orders for this visit:    1. History of MI (myocardial infarction) (Primary)  Assessment & Plan:  S/p normal maternal echo. Remains on ASA and Labetalol. Currently asymptomatic.     Orders:  -     [facility]; Future    2. Multigravida of advanced maternal age in second trimester  Assessment & Plan:  S/p negative NIPT and normal anatomy survey.     Orders:  -     [facility]; Future    3. Type 2 diabetes mellitus without complication, without long-term current use of insulin  Assessment & Plan:  Adjustments in insulin dosing made as per nursing note.    - We will continue to follow weekly and make adjustments as needed    Orders:  -     [facility]; Future    4. Chronic hypertension affecting pregnancy  Assessment & Plan:  Currently on Labetalol 100mgs BID and daily ASA 81mgs. BP today is 104/72.   S/p normal baseline labs.    - " Continue q 4 wk growth ultrasounds    Orders:  -     [facility] [place]; Future    5. Sinusitis, unspecified chronicity, unspecified location  -     amoxicillin-clavulanate (AUGMENTIN) 875-125 MG per tablet; Take 1 tablet by mouth 2 (Two) Times a Day.  Dispense: 14 tablet; Refill: 0  -     [facility] [place]; Future    6. Hypothyroidism affecting pregnancy in second trimester  -     [facility]; Future    7. 24 weeks gestation of pregnancy  -     [facility]; Future         Follow Up  Return in about 4 weeks (around 2024).    I spent 30 minutes caring for the patient on the day of service. This included: obtaining or reviewing a separately obtained medical history, reviewing patient records, performing a medically appropriate exam and/or evaluation, counseling or educating the patient/family/caregiver, ordering medications, labs, and/or procedures and documenting such in the medical record. This does not include time spent on review and interpretation of other tests such as fetal ultrasound or the performance of other procedures such as amniocentesis or CVS.      Cony Alexandre MD  2023

## 2023-12-28 ENCOUNTER — MEDICATION THERAPY MANAGEMENT (OUTPATIENT)
Dept: OBSTETRICS AND GYNECOLOGY | Facility: HOSPITAL | Age: 33
End: 2023-12-28
Payer: COMMERCIAL

## 2023-12-28 ENCOUNTER — TELEPHONE (OUTPATIENT)
Dept: OBSTETRICS AND GYNECOLOGY | Facility: HOSPITAL | Age: 33
End: 2023-12-28
Payer: COMMERCIAL

## 2023-12-28 NOTE — TELEPHONE ENCOUNTER
Spoke with patient over the phone.  Informed patient that Dr. Alexandre has reviewed her blood sugar report and would like to increase her Novolog to 8 units with breakfast and increase Novolog to 10 units with lunch and dinner.  Patient voices understanding.  Mariya Mandel RN

## 2024-01-03 ENCOUNTER — MEDICATION THERAPY MANAGEMENT (OUTPATIENT)
Dept: OBSTETRICS AND GYNECOLOGY | Facility: HOSPITAL | Age: 34
End: 2024-01-03
Payer: COMMERCIAL

## 2024-01-03 DIAGNOSIS — E11.9 TYPE 2 DIABETES MELLITUS WITHOUT COMPLICATION, WITHOUT LONG-TERM CURRENT USE OF INSULIN: Primary | ICD-10-CM

## 2024-01-03 RX ORDER — INSULIN DETEMIR 100 [IU]/ML
18 INJECTION, SOLUTION SUBCUTANEOUS DAILY
Qty: 5 ML | Refills: 2 | Status: SHIPPED | OUTPATIENT
Start: 2024-01-03

## 2024-01-03 RX ORDER — PEN NEEDLE, DIABETIC 30 GX3/16"
1 NEEDLE, DISPOSABLE MISCELLANEOUS 4 TIMES DAILY
Qty: 100 EACH | Refills: 2 | Status: SHIPPED | OUTPATIENT
Start: 2024-01-03

## 2024-01-10 ENCOUNTER — TELEPHONE (OUTPATIENT)
Dept: OBSTETRICS AND GYNECOLOGY | Facility: HOSPITAL | Age: 34
End: 2024-01-10
Payer: COMMERCIAL

## 2024-01-10 ENCOUNTER — MEDICATION THERAPY MANAGEMENT (OUTPATIENT)
Dept: OBSTETRICS AND GYNECOLOGY | Facility: HOSPITAL | Age: 34
End: 2024-01-10
Payer: COMMERCIAL

## 2024-01-10 DIAGNOSIS — E11.9 TYPE 2 DIABETES MELLITUS WITHOUT COMPLICATION, WITHOUT LONG-TERM CURRENT USE OF INSULIN: ICD-10-CM

## 2024-01-10 RX ORDER — INSULIN DETEMIR 100 [IU]/ML
10 INJECTION, SOLUTION SUBCUTANEOUS 2 TIMES DAILY
Qty: 5 ML | Refills: 2 | Status: SHIPPED | OUTPATIENT
Start: 2024-01-10

## 2024-01-10 RX ORDER — PEN NEEDLE, DIABETIC 30 GX3/16"
1 NEEDLE, DISPOSABLE MISCELLANEOUS
Qty: 100 EACH | Refills: 2 | Status: SHIPPED | OUTPATIENT
Start: 2024-01-10

## 2024-01-10 NOTE — TELEPHONE ENCOUNTER
Spoke with patient over the phone.  Informed patient that Dr. Alexandre has reviewed her blood sugar log and is changing her Levemir to 10 units in the morning and at night.  Patient voices understanding.  Mariya Mandel RN

## 2024-01-17 ENCOUNTER — OFFICE VISIT (OUTPATIENT)
Dept: OBSTETRICS AND GYNECOLOGY | Facility: HOSPITAL | Age: 34
End: 2024-01-17
Payer: COMMERCIAL

## 2024-01-17 ENCOUNTER — MEDICATION THERAPY MANAGEMENT (OUTPATIENT)
Dept: OBSTETRICS AND GYNECOLOGY | Facility: HOSPITAL | Age: 34
End: 2024-01-17
Payer: COMMERCIAL

## 2024-01-17 ENCOUNTER — HOSPITAL ENCOUNTER (OUTPATIENT)
Dept: WOMENS IMAGING | Facility: HOSPITAL | Age: 34
Discharge: HOME | End: 2024-01-17
Admitting: OBSTETRICS & GYNECOLOGY
Payer: COMMERCIAL

## 2024-01-17 VITALS — DIASTOLIC BLOOD PRESSURE: 71 MMHG | WEIGHT: 192 LBS | BODY MASS INDEX: 34.01 KG/M2 | SYSTOLIC BLOOD PRESSURE: 108 MMHG

## 2024-01-17 DIAGNOSIS — E11.9 TYPE 2 DIABETES MELLITUS WITHOUT COMPLICATION, WITHOUT LONG-TERM CURRENT USE OF INSULIN: Primary | ICD-10-CM

## 2024-01-17 DIAGNOSIS — Z3A.24 24 WEEKS GESTATION OF PREGNANCY: ICD-10-CM

## 2024-01-17 DIAGNOSIS — E11.9 TYPE 2 DIABETES MELLITUS WITHOUT COMPLICATION, WITHOUT LONG-TERM CURRENT USE OF INSULIN: ICD-10-CM

## 2024-01-17 DIAGNOSIS — O10.919 CHRONIC HYPERTENSION AFFECTING PREGNANCY: ICD-10-CM

## 2024-01-17 DIAGNOSIS — J32.9 SINUSITIS, UNSPECIFIED CHRONICITY, UNSPECIFIED LOCATION: ICD-10-CM

## 2024-01-17 DIAGNOSIS — E03.9 HYPOTHYROIDISM AFFECTING PREGNANCY IN SECOND TRIMESTER: ICD-10-CM

## 2024-01-17 DIAGNOSIS — O09.522 MULTIGRAVIDA OF ADVANCED MATERNAL AGE IN SECOND TRIMESTER: ICD-10-CM

## 2024-01-17 DIAGNOSIS — I25.2 HISTORY OF MI (MYOCARDIAL INFARCTION): ICD-10-CM

## 2024-01-17 DIAGNOSIS — O99.282 HYPOTHYROIDISM AFFECTING PREGNANCY IN SECOND TRIMESTER: ICD-10-CM

## 2024-01-17 PROCEDURE — 76816 OB US FOLLOW-UP PER FETUS: CPT

## 2024-01-17 RX ORDER — INSULIN DETEMIR 100 [IU]/ML
11 INJECTION, SOLUTION SUBCUTANEOUS 2 TIMES DAILY
Qty: 5 ML | Refills: 2 | Status: SHIPPED | OUTPATIENT
Start: 2024-01-17

## 2024-01-17 NOTE — PROGRESS NOTES
Pt has appt with Dr. Moody today. NIPT negative. Pt denies vaginal bleeding, contractions, or loss of fluid. Discussed Dr. Alexandre's insulin changes with patient-increase levemir to 11 units bid and increase novolog to 12 units tid with bigger meals. Pt voices understanding. Pt states Dr. Pack has scheduled a repeat C/S for 3/22/24.

## 2024-01-17 NOTE — LETTER
2024       No Recipients    Patient: Renetta Monte   YOB: 1990   Date of Visit: 2024       Dear Brittany Moody MD,    Thank you for referring Renetta Monte to me for evaluation. Below is a copy of my consult note.    If you have questions, please do not hesitate to call me. I look forward to following Renetta along with you.         Sincerely,        Arsh Reis MD        CC:   No Recipients    Pt has appt with Dr. Moody today. NIPT negative. Pt denies vaginal bleeding, contractions, or loss of fluid. Discussed Dr. Alexandre's insulin changes with patient-increase levemir to 11 units bid and increase novolog to 12 units tid with bigger meals. Pt voices understanding. Pt states Dr. Pack has scheduled a repeat C/S for 3/22/24.        Maternal/Fetal Medicine Consult Note   Date: 2024  Name: Renetta Monte    : 1990     MRN: [id number]     Referring Provider: Brittany Moody MD    Chief Complaint  AMA, type 2 diabetes, chronic hypertension, history of MI    Subjective     History of Present Illness:  Renetta Monte is a 33 y.o.  28w5d who presents today for AMA, type 2 diabetes, chronic hypertension, history of MI    TRISTAN: Estimated Date of Delivery: 24     ROS:   Otherwise Noted in HPI      Current Outpatient Medications:   •  amoxicillin-clavulanate (AUGMENTIN) 875-125 MG per tablet, Take 1 tablet by mouth 2 (Two) Times a Day., Disp: 14 tablet, Rfl: 0  •  aspirin 81 MG EC tablet, Take 1 tablet by mouth Daily., Disp: , Rfl:   •  B-D ULTRAFINE III SHORT PEN 31G X 8 MM misc, , Disp: , Rfl:   •  cetirizine (zyrTEC) 10 MG tablet, Take 1 tablet by mouth Daily., Disp: , Rfl:   •  Continuous Blood Gluc  (Dexcom G6 ) device, , Disp: , Rfl:   •  Continuous Blood Gluc Sensor (Dexcom G6 Sensor), , Disp: , Rfl:   •  Continuous Blood Gluc Transmit (Dexcom G6 Transmitter) misc, , Disp: , Rfl:   •  doxylamine (UNISOM) 25 MG tablet, Take 1  "tablet by mouth Every Night., Disp: , Rfl:   •  FREESTYLE LITE test strip, 1 each by Other route As Needed., Disp: , Rfl:   •  insulin aspart (novoLOG FLEXPEN) 100 UNIT/ML solution pen-injector sc pen, Inject 10 Units under the skin into the appropriate area as directed 3 (Three) Times a Day With Meals. Inject 10 units under the skin into the appropriate area as directed three times a day with meals.Sliding scale 150-180 2 units/  then 2units additional units per each 50 points of blood sugar reading, Disp: 15 mL, Rfl: 0  •  insulin detemir (Levemir FlexPen) 100 UNIT/ML injection, Inject 10 Units under the skin into the appropriate area as directed 2 (Two) Times a Day., Disp: 5 mL, Rfl: 2  •  Insulin Pen Needle (Pen Needles) 31G X 5 MM misc, Use 1 each 5 (Five) Times a Day. Use 1 each 5(Five) times a day., Disp: 100 each, Rfl: 2  •  labetalol (NORMODYNE) 100 MG tablet, Take 1 tablet by mouth 2 (Two) Times a Day., Disp: 180 tablet, Rfl: 3  •  Lancets (freestyle) lancets, , Disp: , Rfl:   •  levothyroxine (SYNTHROID, LEVOTHROID) 88 MCG tablet, Take 1 tablet by mouth Daily., Disp: , Rfl:   •  MAGNESIUM-ZINC PO, Take 200 mg by mouth Daily., Disp: , Rfl:   •  metFORMIN (GLUCOPHAGE) 1000 MG tablet, Take 1 tablet by mouth 2 (Two) Times a Day., Disp: , Rfl:   •  prenatal vitamin (prenatal, CLASSIC, vitamin) tablet, Take 1 tablet by mouth Daily., Disp: , Rfl:   •  sertraline (ZOLOFT) 100 MG tablet, Take 1 tablet by mouth Daily., Disp: , Rfl:   •  sertraline (ZOLOFT) 50 MG tablet, Take 1 tablet by mouth every night at bedtime., Disp: , Rfl:     Objective     Vital Signs  /71   Wt 87.1 kg (192 lb)   LMP 2023   Estimated body mass index is 34.01 kg/m² as calculated from the following:    Height as of 23: 160 cm (63\").    Weight as of this encounter: 87.1 kg (192 lb).    Ultrasound Impression:   See Viewpoint     Assessment and Plan     Renetta Monte is a 33 y.o.  28w5d who presents today for AMA, " type 2 diabetes, chronic hypertension, history of MI    Diagnoses and all orders for this visit:    1. Type 2 diabetes mellitus without complication, without long-term current use of insulin (Primary)  Assessment & Plan:  Growth ultrasound today shows overall fetal weight at the 82nd percentile with abdominal circumference at the 96 percentile.  Blood glucoses were reviewed today and long-acting increased to 11 units twice daily and postprandials were increased to 12 units with meals.      2. History of MI (myocardial infarction)  Assessment & Plan:  Currently asymptomatic and follows with cardiology.  Normal maternal echo.      3. Chronic hypertension affecting pregnancy  Assessment & Plan:  Blood pressure today 108/71.  Currently on labetalol 100 mg twice daily.  No medication changes at this time.      4. Multigravida of advanced maternal age in second trimester  Assessment & Plan:  Status post low risk NIPT and detailed anatomy ultrasound without evidence of aneuploidy.           Follow Up  Follow-up in 4 weeks    I spent 20 minutes caring for the patient on the day of service. This included: obtaining or reviewing a separately obtained medical history, reviewing patient records, performing a medically appropriate exam and/or evaluation, counseling or educating the patient/family/caregiver, ordering medications, labs, and/or procedures and documenting such in the medical record. This does not include time spent on review and interpretation of other tests such as fetal ultrasound or the performance of other procedures such as amniocentesis or CVS.      Arsh Reis MD, FACOG  Maternal Fetal Medicine, [facility] [place]

## 2024-01-17 NOTE — PROGRESS NOTES
Maternal/Fetal Medicine Consult Note   Date: 2024  Name: Renetta Monte    : 1990     MRN: [id number]     Referring Provider: Brittany Moody MD    Chief Complaint  AMA, type 2 diabetes, chronic hypertension, history of MI    Subjective     History of Present Illness:  Renetta Monte is a 33 y.o.  28w5d who presents today for AMA, type 2 diabetes, chronic hypertension, history of MI    TRISTAN: Estimated Date of Delivery: 24     ROS:   Otherwise Noted in HPI      Current Outpatient Medications:     amoxicillin-clavulanate (AUGMENTIN) 875-125 MG per tablet, Take 1 tablet by mouth 2 (Two) Times a Day., Disp: 14 tablet, Rfl: 0    aspirin 81 MG EC tablet, Take 1 tablet by mouth Daily., Disp: , Rfl:     B-D ULTRAFINE III SHORT PEN 31G X 8 MM misc, , Disp: , Rfl:     cetirizine (zyrTEC) 10 MG tablet, Take 1 tablet by mouth Daily., Disp: , Rfl:     Continuous Blood Gluc  (Dexcom G6 ) device, , Disp: , Rfl:     Continuous Blood Gluc Sensor (Dexcom G6 Sensor), , Disp: , Rfl:     Continuous Blood Gluc Transmit (Dexcom G6 Transmitter) misc, , Disp: , Rfl:     doxylamine (UNISOM) 25 MG tablet, Take 1 tablet by mouth Every Night., Disp: , Rfl:     FREESTYLE LITE test strip, 1 each by Other route As Needed., Disp: , Rfl:     insulin aspart (novoLOG FLEXPEN) 100 UNIT/ML solution pen-injector sc pen, Inject 10 Units under the skin into the appropriate area as directed 3 (Three) Times a Day With Meals. Inject 10 units under the skin into the appropriate area as directed three times a day with meals.Sliding scale 150-180 2 units/  then 2units additional units per each 50 points of blood sugar reading, Disp: 15 mL, Rfl: 0    insulin detemir (Levemir FlexPen) 100 UNIT/ML injection, Inject 10 Units under the skin into the appropriate area as directed 2 (Two) Times a Day., Disp: 5 mL, Rfl: 2    Insulin Pen Needle (Pen Needles) 31G X 5 MM misc, Use 1 each 5 (Five) Times a Day. Use 1 each  "5(Five) times a day., Disp: 100 each, Rfl: 2    labetalol (NORMODYNE) 100 MG tablet, Take 1 tablet by mouth 2 (Two) Times a Day., Disp: 180 tablet, Rfl: 3    Lancets (freestyle) lancets, , Disp: , Rfl:     levothyroxine (SYNTHROID, LEVOTHROID) 88 MCG tablet, Take 1 tablet by mouth Daily., Disp: , Rfl:     MAGNESIUM-ZINC PO, Take 200 mg by mouth Daily., Disp: , Rfl:     metFORMIN (GLUCOPHAGE) 1000 MG tablet, Take 1 tablet by mouth 2 (Two) Times a Day., Disp: , Rfl:     prenatal vitamin (prenatal, CLASSIC, vitamin) tablet, Take 1 tablet by mouth Daily., Disp: , Rfl:     sertraline (ZOLOFT) 100 MG tablet, Take 1 tablet by mouth Daily., Disp: , Rfl:     sertraline (ZOLOFT) 50 MG tablet, Take 1 tablet by mouth every night at bedtime., Disp: , Rfl:     Objective     Vital Signs  /71   Wt 87.1 kg (192 lb)   LMP 2023   Estimated body mass index is 34.01 kg/m² as calculated from the following:    Height as of 23: 160 cm (63\").    Weight as of this encounter: 87.1 kg (192 lb).    Ultrasound Impression:   See Viewpoint     Assessment and Plan     Renetta Monte is a 33 y.o.  28w5d who presents today for AMA, type 2 diabetes, chronic hypertension, history of MI    Diagnoses and all orders for this visit:    1. Type 2 diabetes mellitus without complication, without long-term current use of insulin (Primary)  Assessment & Plan:  Growth ultrasound today shows overall fetal weight at the 82nd percentile with abdominal circumference at the 96 percentile.  Blood glucoses were reviewed today and long-acting increased to 11 units twice daily and postprandials were increased to 12 units with meals.      2. History of MI (myocardial infarction)  Assessment & Plan:  Currently asymptomatic and follows with cardiology.  Normal maternal echo.      3. Chronic hypertension affecting pregnancy  Assessment & Plan:  Blood pressure today 108/71.  Currently on labetalol 100 mg twice daily.  No medication changes at " this time.      4. Multigravida of advanced maternal age in second trimester  Assessment & Plan:  Status post low risk NIPT and detailed anatomy ultrasound without evidence of aneuploidy.           Follow Up  Follow-up in 4 weeks    I spent 20 minutes caring for the patient on the day of service. This included: obtaining or reviewing a separately obtained medical history, reviewing patient records, performing a medically appropriate exam and/or evaluation, counseling or educating the patient/family/caregiver, ordering medications, labs, and/or procedures and documenting such in the medical record. This does not include time spent on review and interpretation of other tests such as fetal ultrasound or the performance of other procedures such as amniocentesis or CVS.      Arsh Reis MD, FACOG  Maternal Fetal Medicine, [facility] [place]

## 2024-01-17 NOTE — ASSESSMENT & PLAN NOTE
Growth ultrasound today shows overall fetal weight at the 82nd percentile with abdominal circumference at the 96 percentile.  Blood glucoses were reviewed today and long-acting increased to 11 units twice daily and postprandials were increased to 12 units with meals.

## 2024-01-17 NOTE — ASSESSMENT & PLAN NOTE
Blood pressure today 108/71.  Currently on labetalol 100 mg twice daily.  No medication changes at this time.

## 2024-01-24 ENCOUNTER — TELEPHONE (OUTPATIENT)
Dept: OBSTETRICS AND GYNECOLOGY | Facility: HOSPITAL | Age: 34
End: 2024-01-24
Payer: COMMERCIAL

## 2024-01-24 ENCOUNTER — MEDICATION THERAPY MANAGEMENT (OUTPATIENT)
Dept: OBSTETRICS AND GYNECOLOGY | Facility: HOSPITAL | Age: 34
End: 2024-01-24
Payer: COMMERCIAL

## 2024-01-24 DIAGNOSIS — E11.9 TYPE 2 DIABETES MELLITUS WITHOUT COMPLICATION, WITHOUT LONG-TERM CURRENT USE OF INSULIN: ICD-10-CM

## 2024-01-24 NOTE — TELEPHONE ENCOUNTER
Spoke with patient over the phone.  Informed patient that Dr. Alexandre has reviewed her blood sugar log and is increasing her Novolog to 14 units with breakfast and with dinner.  Patient voices understanding.  Mariya Mandel RN

## 2024-01-31 ENCOUNTER — TELEPHONE (OUTPATIENT)
Dept: OBSTETRICS AND GYNECOLOGY | Facility: HOSPITAL | Age: 34
End: 2024-01-31
Payer: COMMERCIAL

## 2024-01-31 ENCOUNTER — MEDICATION THERAPY MANAGEMENT (OUTPATIENT)
Dept: OBSTETRICS AND GYNECOLOGY | Facility: HOSPITAL | Age: 34
End: 2024-01-31
Payer: COMMERCIAL

## 2024-01-31 DIAGNOSIS — E11.9 TYPE 2 DIABETES MELLITUS WITHOUT COMPLICATION, WITHOUT LONG-TERM CURRENT USE OF INSULIN: ICD-10-CM

## 2024-01-31 RX ORDER — INSULIN DETEMIR 100 [IU]/ML
10 INJECTION, SOLUTION SUBCUTANEOUS 2 TIMES DAILY
Qty: 5 ML | Refills: 2 | Status: SHIPPED | OUTPATIENT
Start: 2024-01-31

## 2024-01-31 NOTE — TELEPHONE ENCOUNTER
Attempted to speak with patient, received voice mail.  Message left stating Dr. Alexandre has reviewed your blood sugar log and wants you to decrease your Levemir to 10 units twice a day.  Please let us know you have received this message.  Mariya Mandel RN

## 2024-02-01 ENCOUNTER — DOCUMENTATION (OUTPATIENT)
Dept: SOCIAL WORK | Facility: HOSPITAL | Age: 34
End: 2024-02-01
Payer: COMMERCIAL

## 2024-02-01 NOTE — CASE MANAGEMENT/SOCIAL WORK
Per pt's request- emailed listing of local hotels that provide a discount to [email or web address]  [place]

## 2024-02-05 ENCOUNTER — TELEPHONE (OUTPATIENT)
Dept: OBSTETRICS AND GYNECOLOGY | Facility: HOSPITAL | Age: 34
End: 2024-02-05
Payer: COMMERCIAL

## 2024-02-05 NOTE — TELEPHONE ENCOUNTER
Attempted to reach patient by phone.  Received voice mail of patients mother.  Left message simply stating a [employer] message had been sent and to please read.  Sent [employer] message stating Dr. Reis had reviewed her blood sugar log and is not making changes this week. Requested patient respond to [employer] message so we know she received the message.  Mariya Mandel RN

## 2024-02-14 ENCOUNTER — OFFICE VISIT (OUTPATIENT)
Dept: OBSTETRICS AND GYNECOLOGY | Facility: HOSPITAL | Age: 34
End: 2024-02-14
Payer: COMMERCIAL

## 2024-02-14 ENCOUNTER — HOSPITAL ENCOUNTER (OUTPATIENT)
Dept: WOMENS IMAGING | Facility: HOSPITAL | Age: 34
Discharge: HOME | End: 2024-02-14
Admitting: OBSTETRICS & GYNECOLOGY
Payer: COMMERCIAL

## 2024-02-14 VITALS — WEIGHT: 195 LBS | BODY MASS INDEX: 34.54 KG/M2 | DIASTOLIC BLOOD PRESSURE: 82 MMHG | SYSTOLIC BLOOD PRESSURE: 122 MMHG

## 2024-02-14 DIAGNOSIS — E11.9 TYPE 2 DIABETES MELLITUS WITHOUT COMPLICATION, WITHOUT LONG-TERM CURRENT USE OF INSULIN: Primary | ICD-10-CM

## 2024-02-14 DIAGNOSIS — O09.522 MULTIGRAVIDA OF ADVANCED MATERNAL AGE IN SECOND TRIMESTER: ICD-10-CM

## 2024-02-14 DIAGNOSIS — I25.2 HISTORY OF MI (MYOCARDIAL INFARCTION): ICD-10-CM

## 2024-02-14 DIAGNOSIS — O10.919 CHRONIC HYPERTENSION AFFECTING PREGNANCY: ICD-10-CM

## 2024-02-14 PROCEDURE — 76819 FETAL BIOPHYS PROFIL W/O NST: CPT

## 2024-02-14 PROCEDURE — 76816 OB US FOLLOW-UP PER FETUS: CPT

## 2024-02-21 ENCOUNTER — MEDICATION THERAPY MANAGEMENT (OUTPATIENT)
Dept: OBSTETRICS AND GYNECOLOGY | Facility: HOSPITAL | Age: 34
End: 2024-02-21
Payer: COMMERCIAL

## 2024-02-21 ENCOUNTER — TELEPHONE (OUTPATIENT)
Dept: OBSTETRICS AND GYNECOLOGY | Facility: HOSPITAL | Age: 34
End: 2024-02-21
Payer: COMMERCIAL

## 2024-02-21 DIAGNOSIS — E11.9 TYPE 2 DIABETES MELLITUS WITHOUT COMPLICATION, WITHOUT LONG-TERM CURRENT USE OF INSULIN: ICD-10-CM

## 2024-02-21 NOTE — TELEPHONE ENCOUNTER
Spoke with patient over the phone.  Informed patient that Dr. Alexandre has reviewed her blood sugar report and is changing her Lispro to 14 units with breakfast and lunch and 16 units with dinner.  Patient voices understanding.  Mariya Mandel RN

## 2024-02-28 ENCOUNTER — MEDICATION THERAPY MANAGEMENT (OUTPATIENT)
Dept: OBSTETRICS AND GYNECOLOGY | Facility: HOSPITAL | Age: 34
End: 2024-02-28
Payer: COMMERCIAL

## 2024-02-28 ENCOUNTER — TELEPHONE (OUTPATIENT)
Dept: OBSTETRICS AND GYNECOLOGY | Facility: HOSPITAL | Age: 34
End: 2024-02-28
Payer: COMMERCIAL

## 2024-02-28 DIAGNOSIS — E11.9 TYPE 2 DIABETES MELLITUS WITHOUT COMPLICATION, WITHOUT LONG-TERM CURRENT USE OF INSULIN: ICD-10-CM

## 2024-02-28 NOTE — TELEPHONE ENCOUNTER
Spoke with patient over the phone.  Informed patient that Dr. Alexandre has reviewed her blood sugar log and is increasing  her novolog to 16 units with lunch and 18 units with dinner.  Patient requested it also be sent via [email or web address]. (Done)  patient denies any questions or concerns  Mariya Mandel RN

## 2024-03-06 ENCOUNTER — LAB (OUTPATIENT)
Dept: LAB | Facility: HOSPITAL | Age: 34
End: 2024-03-06
Payer: COMMERCIAL

## 2024-03-06 ENCOUNTER — TRANSCRIBE ORDERS (OUTPATIENT)
Dept: LAB | Facility: HOSPITAL | Age: 34
End: 2024-03-06
Payer: COMMERCIAL

## 2024-03-06 ENCOUNTER — TELEPHONE (OUTPATIENT)
Dept: OBSTETRICS AND GYNECOLOGY | Facility: HOSPITAL | Age: 34
End: 2024-03-06
Payer: COMMERCIAL

## 2024-03-06 DIAGNOSIS — N94.89 PELVIC CONGESTION SYNDROME: ICD-10-CM

## 2024-03-06 DIAGNOSIS — O24.113 PRE-EXISTING TYPE 2 DIABETES MELLITUS IN PREGNANCY IN THIRD TRIMESTER: ICD-10-CM

## 2024-03-06 DIAGNOSIS — O09.523 SUPERVISION OF ELDERLY MULTIGRAVIDA IN THIRD TRIMESTER: ICD-10-CM

## 2024-03-06 DIAGNOSIS — O34.219 PREVIOUS CESAREAN DELIVERY, ANTEPARTUM CONDITION OR COMPLICATION: ICD-10-CM

## 2024-03-06 DIAGNOSIS — Z3A.35 35 WEEKS GESTATION OF PREGNANCY: ICD-10-CM

## 2024-03-06 DIAGNOSIS — O09.893 HISTORY OF MATERNAL HEMATOMA OF BROAD LIGAMENT, CURRENTLY PREGNANT, THIRD TRIMESTER: ICD-10-CM

## 2024-03-06 DIAGNOSIS — O09.893 HISTORY OF MATERNAL HEMATOMA OF BROAD LIGAMENT, CURRENTLY PREGNANT, THIRD TRIMESTER: Primary | ICD-10-CM

## 2024-03-06 PROCEDURE — 87086 URINE CULTURE/COLONY COUNT: CPT

## 2024-03-06 NOTE — TELEPHONE ENCOUNTER
Attempted to reach patient by phone, received voice mail.  Left message stating that Dr. Alexandre has reviewed her blood sugar log and is not making changes.  Mariya Mandel RN

## 2024-03-07 LAB — BACTERIA SPEC AEROBE CULT: NORMAL

## 2024-03-11 ENCOUNTER — TELEPHONE (OUTPATIENT)
Dept: OBSTETRICS AND GYNECOLOGY | Facility: HOSPITAL | Age: 34
End: 2024-03-11
Payer: COMMERCIAL

## 2024-03-11 NOTE — TELEPHONE ENCOUNTER
Spoke with patient over the phone.  Informed patient that Dr. Alexandre has her insulin delivery plan.  They day before her C/S she is to take her am Levemir and mealt time Novolg as scheduled.  For the evening Levemir she is to only take 6 units.  She is not to take levemir the day of her C/S.  Patient voices understanding.  Mariya Mandel RN

## 2024-03-12 PROBLEM — Z34.90 TERM PREGNANCY: Status: ACTIVE | Noted: 2024-03-12

## 2024-03-13 ENCOUNTER — PRE-ADMISSION TESTING (OUTPATIENT)
Dept: PREADMISSION TESTING | Facility: HOSPITAL | Age: 34
End: 2024-03-13
Payer: COMMERCIAL

## 2024-03-13 ENCOUNTER — HOSPITAL ENCOUNTER (OUTPATIENT)
Dept: WOMENS IMAGING | Facility: HOSPITAL | Age: 34
Discharge: HOME | End: 2024-03-13
Payer: COMMERCIAL

## 2024-03-13 ENCOUNTER — OFFICE VISIT (OUTPATIENT)
Dept: OBSTETRICS AND GYNECOLOGY | Facility: HOSPITAL | Age: 34
End: 2024-03-13
Payer: COMMERCIAL

## 2024-03-13 VITALS — BODY MASS INDEX: 36.85 KG/M2 | DIASTOLIC BLOOD PRESSURE: 78 MMHG | SYSTOLIC BLOOD PRESSURE: 113 MMHG | WEIGHT: 208 LBS

## 2024-03-13 VITALS — BODY MASS INDEX: 38.24 KG/M2 | HEIGHT: 62 IN | WEIGHT: 207.78 LBS

## 2024-03-13 DIAGNOSIS — E11.9 TYPE 2 DIABETES MELLITUS WITHOUT COMPLICATION, WITHOUT LONG-TERM CURRENT USE OF INSULIN: ICD-10-CM

## 2024-03-13 DIAGNOSIS — I25.2 HISTORY OF MI (MYOCARDIAL INFARCTION): ICD-10-CM

## 2024-03-13 DIAGNOSIS — O10.919 CHRONIC HYPERTENSION AFFECTING PREGNANCY: Primary | ICD-10-CM

## 2024-03-13 LAB
ABO GROUP BLD: NORMAL
BLD GP AB SCN SERPL QL: NEGATIVE
DEPRECATED RDW RBC AUTO: 47.4 FL (ref 37–54)
ERYTHROCYTE [DISTWIDTH] IN BLOOD BY AUTOMATED COUNT: 13.9 % (ref 12.3–15.4)
HCT VFR BLD AUTO: 38.1 % (ref 34–46.6)
HGB BLD-MCNC: 12.5 G/DL (ref 12–15.9)
MCH RBC QN AUTO: 30.6 PG (ref 26.6–33)
MCHC RBC AUTO-ENTMCNC: 32.8 G/DL (ref 31.5–35.7)
MCV RBC AUTO: 93.4 FL (ref 79–97)
PLATELET # BLD AUTO: 135 10*3/MM3 (ref 140–450)
PMV BLD AUTO: 12.1 FL (ref 6–12)
RBC # BLD AUTO: 4.08 10*6/MM3 (ref 3.77–5.28)
RH BLD: POSITIVE
T PALLIDUM IGG SER QL: NORMAL
T&S EXPIRATION DATE: NORMAL
WBC NRBC COR # BLD AUTO: 9.1 10*3/MM3 (ref 3.4–10.8)

## 2024-03-13 PROCEDURE — 36415 COLL VENOUS BLD VENIPUNCTURE: CPT

## 2024-03-13 PROCEDURE — 86901 BLOOD TYPING SEROLOGIC RH(D): CPT

## 2024-03-13 PROCEDURE — 85027 COMPLETE CBC AUTOMATED: CPT

## 2024-03-13 PROCEDURE — 86850 RBC ANTIBODY SCREEN: CPT

## 2024-03-13 PROCEDURE — 86900 BLOOD TYPING SEROLOGIC ABO: CPT

## 2024-03-13 PROCEDURE — 76819 FETAL BIOPHYS PROFIL W/O NST: CPT

## 2024-03-13 PROCEDURE — 76816 OB US FOLLOW-UP PER FETUS: CPT

## 2024-03-13 PROCEDURE — 86780 TREPONEMA PALLIDUM: CPT | Performed by: OBSTETRICS & GYNECOLOGY

## 2024-03-13 NOTE — PROGRESS NOTES
Pt has an appt with Dr. Moody today. NIPT negative. Pt denies vaginal bleeding or loss of fluid. Pt reports hip pain and irregular contractions. Pt has a repeat C/S with tubal ligation scheduled on 3/15/24.

## 2024-03-13 NOTE — DISCHARGE INSTRUCTIONS
What to know before your arrive:    -Do not eat, drink or chew gum beginning 8 hours before your scheduled arrival time to the hospital.  Except please drink 20 ounces of Gatorade and complete two hours before your given arrival time to the hospital.  If you drink too close to surgery time, your sugery may  be delayed or cancelled.  Please complete as instructed.     -Do not shave any part of your body including abdomen or pelvic are for two days before your procedure.  -If you are taking a scheduled medication (insulin, blood pressure medicine,antibiotics) please consult with your physician whether to take on the day of surgery.  -Remove all jewelry including rings, wedding bands, and piercing before coming to the hospital.  -Leave important valuables at home.  -Do not wear dark fingernail polish.  -Please take two Tylenol 500 mg tablets the evening before surgery.  -Bring the following with you to the hospital:    -Picture ID and insurance, Medicare or Medicaid cards    -Co-pay/deductible required by insurance (Cash, Check, Credit Card)    -Copy of living will or power  document (if applicable)    -CPAP mask and tubing, not machine (if applicable)    -Skin prep instructions sheet    What to know the day of procedure:    -Park in the [facility], take elevator for first floor, exit to the right and  proceed through the doors to outside, follow the covered sidewalk to the entrance of the [place], [facility], enter the [place] to your right BEFORE entering the [address]20 lobby.  Take the elevators to the 3rd floor ([address] [place]).  -Leave unnecessary items in your vehicle, including your suitcase.  Your support  person or a family member can get it for you after your procedure.   -Check in at the reception desk in the lobby of the 3rd floor ([address] [place]).   -One person may accompany you to the pre-op/recovery area.  Please have  other family members wait in the  waiting room.   -An anesthesiologist will meet with your prior to your procedure.   -After anesthesia has been initiated, one person may accompany you in the  operating room.   -No video cameras are permitted in the operating room; only still cameras,  Please.      What to expect while you are in recovery:     -One person may stay with you while you are in recovery.   -If the baby is stable, he/she may visit to initiate breastfeeding & [employer].      CHLORHEXIDINE GLUCONATE WIPES AND INSTRUCTIONS GIVEN TO PATIENT

## 2024-03-13 NOTE — PAT
An arrival time for procedure was not provided during PAT visit. If patient had any questions or concerns about their arrival time, they were instructed to contact their surgeon/physician.  Additionally, if the patient referred to an arrival time that was acquired from their my chart account, patient was encouraged to verify that time with their surgeon/physician. Arrival times are NOT provided in Pre Admission Testing Department.    Instructed patient to take two Tylenol extra strength (total of 1000 mg) the night before surgery.    Patient instructed to drink 20 ounces of Gatorade or Gatorlyte (if diabetic) and it needs to be completed 1 hour (for Main OR patients) or 2 hours (scheduled  section & [facility] patients) before given arrival time for procedure (NO RED Gatorade and NO Gatorade Zero).    Patient verbalized understanding.    Patient to apply Chlorhexadine wipes  to surgical area (as instructed) the night before procedure and the AM of procedure. Wipes provided.    Blood bank bracelet applied to patient during Pre Admission Testing visit.  Patient instructed not to remove from arm until after procedure and they are discharged from the hospital.  Explained to patient that they may shower and get the bracelet wet, but not to immerse under water for longer periods (bathing, swimming, hand dishwashing, etc).  Patient verbalized understanding.    Patient viewed general PAT education video as instructed in their preoperative information received from their surgeon.  Patient stated the general PAT education video was viewed in its entirety and survey completed.  Copies of PAT general education handouts (Incentive Spirometry, Meds to Beds Program, Patient Belongings, Pre-op skin preparation instructions, Blood Glucose testing, Visitor policy, Surgery FAQ, Code H) distributed to patient if not printed. Education related to the PAT pass and skin preparation for surgery (if applicable) completed in PAT as a  reinforcement to PAT education video. Patient instructed to return PAT pass provided today as well as completed skin preparation sheet (if applicable) on the day of procedure.     Additionally if patient had not viewed video yet but intended to view it at home or in our waiting area, then referred them to the handout with QR code/link provided during PAT visit.  Encouraged patient/family to read PAT general education handouts thoroughly and notify PAT staff with any questions or concerns. Patient verbalized understanding of all information and priority content.

## 2024-03-15 ENCOUNTER — ANESTHESIA (OUTPATIENT)
Dept: LABOR AND DELIVERY | Facility: HOSPITAL | Age: 34
End: 2024-03-15
Payer: COMMERCIAL

## 2024-03-15 ENCOUNTER — HOSPITAL ENCOUNTER (INPATIENT)
Facility: HOSPITAL | Age: 34
LOS: 3 days | Discharge: HOME | End: 2024-03-18
Attending: OBSTETRICS & GYNECOLOGY | Admitting: OBSTETRICS & GYNECOLOGY
Payer: COMMERCIAL

## 2024-03-15 ENCOUNTER — ANESTHESIA EVENT (OUTPATIENT)
Dept: LABOR AND DELIVERY | Facility: HOSPITAL | Age: 34
End: 2024-03-15
Payer: COMMERCIAL

## 2024-03-15 DIAGNOSIS — Z98.891 PREVIOUS CESAREAN SECTION: ICD-10-CM

## 2024-03-15 DIAGNOSIS — O24.119: ICD-10-CM

## 2024-03-15 DIAGNOSIS — O13.9 GESTATIONAL HYPERTENSION, UNSPECIFIED TRIMESTER: ICD-10-CM

## 2024-03-15 DIAGNOSIS — Z30.2 STERILIZATION: ICD-10-CM

## 2024-03-15 DIAGNOSIS — E11.9 TYPE 2 DIABETES MELLITUS WITHOUT COMPLICATION, WITHOUT LONG-TERM CURRENT USE OF INSULIN: Primary | ICD-10-CM

## 2024-03-15 LAB
DEPRECATED RDW RBC AUTO: 46.5 FL (ref 37–54)
ERYTHROCYTE [DISTWIDTH] IN BLOOD BY AUTOMATED COUNT: 13.6 % (ref 12.3–15.4)
GLUCOSE BLDC GLUCOMTR-MCNC: 117 MG/DL (ref 70–130)
GLUCOSE BLDC GLUCOMTR-MCNC: 81 MG/DL (ref 70–130)
HCT VFR BLD AUTO: 34.7 % (ref 34–46.6)
HCT VFR BLD AUTO: 37.5 % (ref 34–46.6)
HGB BLD-MCNC: 11.2 G/DL (ref 12–15.9)
HGB BLD-MCNC: 12.3 G/DL (ref 12–15.9)
MCH RBC QN AUTO: 30.8 PG (ref 26.6–33)
MCHC RBC AUTO-ENTMCNC: 32.8 G/DL (ref 31.5–35.7)
MCV RBC AUTO: 94 FL (ref 79–97)
PLATELET # BLD AUTO: 128 10*3/MM3 (ref 140–450)
PMV BLD AUTO: 12.4 FL (ref 6–12)
RBC # BLD AUTO: 3.99 10*6/MM3 (ref 3.77–5.28)
WBC NRBC COR # BLD AUTO: 8.9 10*3/MM3 (ref 3.4–10.8)

## 2024-03-15 PROCEDURE — 25810000003 LACTATED RINGERS PER 1000 ML: Performed by: OBSTETRICS & GYNECOLOGY

## 2024-03-15 PROCEDURE — 25010000002 FENTANYL CITRATE (PF) 100 MCG/2ML SOLUTION: Performed by: NURSE ANESTHETIST, CERTIFIED REGISTERED

## 2024-03-15 PROCEDURE — 85014 HEMATOCRIT: CPT | Performed by: OBSTETRICS & GYNECOLOGY

## 2024-03-15 PROCEDURE — 85027 COMPLETE CBC AUTOMATED: CPT | Performed by: OBSTETRICS & GYNECOLOGY

## 2024-03-15 PROCEDURE — 25010000002 OXYTOCIN PER 10 UNITS: Performed by: NURSE ANESTHETIST, CERTIFIED REGISTERED

## 2024-03-15 PROCEDURE — 82948 REAGENT STRIP/BLOOD GLUCOSE: CPT

## 2024-03-15 PROCEDURE — 85018 HEMOGLOBIN: CPT | Performed by: OBSTETRICS & GYNECOLOGY

## 2024-03-15 PROCEDURE — 25010000002 CEFAZOLIN PER 500 MG: Performed by: OBSTETRICS & GYNECOLOGY

## 2024-03-15 PROCEDURE — 63710000001 DIPHENHYDRAMINE PER 50 MG: Performed by: NURSE ANESTHETIST, CERTIFIED REGISTERED

## 2024-03-15 PROCEDURE — 25810000003 LACTATED RINGERS SOLUTION: Performed by: OBSTETRICS & GYNECOLOGY

## 2024-03-15 PROCEDURE — 25010000002 METOCLOPRAMIDE PER 10 MG: Performed by: NURSE ANESTHETIST, CERTIFIED REGISTERED

## 2024-03-15 PROCEDURE — 25010000002 MORPHINE PER 10 MG: Performed by: NURSE ANESTHETIST, CERTIFIED REGISTERED

## 2024-03-15 PROCEDURE — 25010000002 BUPIVACAINE IN DEXTROSE 0.75-8.25 % SOLUTION: Performed by: NURSE ANESTHETIST, CERTIFIED REGISTERED

## 2024-03-15 PROCEDURE — 25010000002 MIDAZOLAM PER 1 MG: Performed by: NURSE ANESTHETIST, CERTIFIED REGISTERED

## 2024-03-15 PROCEDURE — 0UB70ZZ EXCISION OF BILATERAL FALLOPIAN TUBES, OPEN APPROACH: ICD-10-PCS | Performed by: OBSTETRICS & GYNECOLOGY

## 2024-03-15 PROCEDURE — 25010000002 KETOROLAC TROMETHAMINE PER 15 MG: Performed by: OBSTETRICS & GYNECOLOGY

## 2024-03-15 PROCEDURE — 25010000002 ONDANSETRON PER 1 MG: Performed by: NURSE ANESTHETIST, CERTIFIED REGISTERED

## 2024-03-15 PROCEDURE — 59025 FETAL NON-STRESS TEST: CPT

## 2024-03-15 PROCEDURE — 88302 TISSUE EXAM BY PATHOLOGIST: CPT | Performed by: OBSTETRICS & GYNECOLOGY

## 2024-03-15 RX ORDER — DIPHENHYDRAMINE HCL 25 MG
25 CAPSULE ORAL EVERY 4 HOURS PRN
Status: COMPLETED | OUTPATIENT
Start: 2024-03-15 | End: 2024-03-15

## 2024-03-15 RX ORDER — PRENATAL VIT/IRON FUM/FOLIC AC 27MG-0.8MG
1 TABLET ORAL DAILY
Status: DISCONTINUED | OUTPATIENT
Start: 2024-03-15 | End: 2024-03-18 | Stop reason: HOSPADM

## 2024-03-15 RX ORDER — METOCLOPRAMIDE HYDROCHLORIDE 5 MG/ML
INJECTION INTRAMUSCULAR; INTRAVENOUS AS NEEDED
Status: DISCONTINUED | OUTPATIENT
Start: 2024-03-15 | End: 2024-03-15 | Stop reason: SURG

## 2024-03-15 RX ORDER — METHYLERGONOVINE MALEATE 0.2 MG/ML
200 INJECTION INTRAVENOUS ONCE AS NEEDED
Status: DISCONTINUED | OUTPATIENT
Start: 2024-03-15 | End: 2024-03-15 | Stop reason: HOSPADM

## 2024-03-15 RX ORDER — DIPHENHYDRAMINE HYDROCHLORIDE 50 MG/ML
25 INJECTION INTRAMUSCULAR; INTRAVENOUS EVERY 4 HOURS PRN
Status: COMPLETED | OUTPATIENT
Start: 2024-03-15 | End: 2024-03-15

## 2024-03-15 RX ORDER — OXYTOCIN 10 [USP'U]/ML
INJECTION, SOLUTION INTRAMUSCULAR; INTRAVENOUS AS NEEDED
Status: DISCONTINUED | OUTPATIENT
Start: 2024-03-15 | End: 2024-03-15 | Stop reason: SURG

## 2024-03-15 RX ORDER — MIDAZOLAM HYDROCHLORIDE 1 MG/ML
INJECTION INTRAMUSCULAR; INTRAVENOUS AS NEEDED
Status: DISCONTINUED | OUTPATIENT
Start: 2024-03-15 | End: 2024-03-15 | Stop reason: SURG

## 2024-03-15 RX ORDER — DIPHENHYDRAMINE HYDROCHLORIDE 50 MG/ML
25 INJECTION INTRAMUSCULAR; INTRAVENOUS ONCE AS NEEDED
Status: COMPLETED | OUTPATIENT
Start: 2024-03-15 | End: 2024-03-15

## 2024-03-15 RX ORDER — CETIRIZINE HYDROCHLORIDE 10 MG/1
10 TABLET ORAL NIGHTLY
Status: DISCONTINUED | OUTPATIENT
Start: 2024-03-15 | End: 2024-03-18 | Stop reason: HOSPADM

## 2024-03-15 RX ORDER — SODIUM CHLORIDE, SODIUM LACTATE, POTASSIUM CHLORIDE, CALCIUM CHLORIDE 600; 310; 30; 20 MG/100ML; MG/100ML; MG/100ML; MG/100ML
125 INJECTION, SOLUTION INTRAVENOUS CONTINUOUS
Status: DISCONTINUED | OUTPATIENT
Start: 2024-03-15 | End: 2024-03-18 | Stop reason: HOSPADM

## 2024-03-15 RX ORDER — MORPHINE SULFATE 0.5 MG/ML
INJECTION, SOLUTION EPIDURAL; INTRATHECAL; INTRAVENOUS AS NEEDED
Status: DISCONTINUED | OUTPATIENT
Start: 2024-03-15 | End: 2024-03-15 | Stop reason: SURG

## 2024-03-15 RX ORDER — OXYTOCIN/0.9 % SODIUM CHLORIDE 30/500 ML
125 PLASTIC BAG, INJECTION (ML) INTRAVENOUS ONCE AS NEEDED
Status: DISCONTINUED | OUTPATIENT
Start: 2024-03-15 | End: 2024-03-18 | Stop reason: HOSPADM

## 2024-03-15 RX ORDER — SODIUM CHLORIDE 0.9 % (FLUSH) 0.9 %
10 SYRINGE (ML) INJECTION AS NEEDED
Status: DISCONTINUED | OUTPATIENT
Start: 2024-03-15 | End: 2024-03-15 | Stop reason: HOSPADM

## 2024-03-15 RX ORDER — OXYTOCIN/0.9 % SODIUM CHLORIDE 30/500 ML
650 PLASTIC BAG, INJECTION (ML) INTRAVENOUS ONCE
Status: DISCONTINUED | OUTPATIENT
Start: 2024-03-15 | End: 2024-03-15 | Stop reason: HOSPADM

## 2024-03-15 RX ORDER — ONDANSETRON 2 MG/ML
4 INJECTION INTRAMUSCULAR; INTRAVENOUS EVERY 6 HOURS PRN
Status: DISCONTINUED | OUTPATIENT
Start: 2024-03-15 | End: 2024-03-15 | Stop reason: HOSPADM

## 2024-03-15 RX ORDER — ONDANSETRON 2 MG/ML
4 INJECTION INTRAMUSCULAR; INTRAVENOUS ONCE AS NEEDED
Status: ACTIVE | OUTPATIENT
Start: 2024-03-15 | End: 2024-03-16

## 2024-03-15 RX ORDER — ONDANSETRON 4 MG/1
4 TABLET, ORALLY DISINTEGRATING ORAL EVERY 6 HOURS PRN
Status: DISCONTINUED | OUTPATIENT
Start: 2024-03-15 | End: 2024-03-15 | Stop reason: HOSPADM

## 2024-03-15 RX ORDER — SIMETHICONE 80 MG
80 TABLET,CHEWABLE ORAL 4 TIMES DAILY PRN
Status: DISCONTINUED | OUTPATIENT
Start: 2024-03-15 | End: 2024-03-18 | Stop reason: HOSPADM

## 2024-03-15 RX ORDER — DOCUSATE SODIUM 100 MG/1
100 CAPSULE, LIQUID FILLED ORAL 2 TIMES DAILY PRN
Status: DISCONTINUED | OUTPATIENT
Start: 2024-03-15 | End: 2024-03-18 | Stop reason: HOSPADM

## 2024-03-15 RX ORDER — NALOXONE HCL 0.4 MG/ML
0.4 VIAL (ML) INJECTION
Status: ACTIVE | OUTPATIENT
Start: 2024-03-15 | End: 2024-03-16

## 2024-03-15 RX ORDER — ENOXAPARIN SODIUM 100 MG/ML
40 INJECTION SUBCUTANEOUS NIGHTLY
Status: DISCONTINUED | OUTPATIENT
Start: 2024-03-16 | End: 2024-03-18 | Stop reason: HOSPADM

## 2024-03-15 RX ORDER — KETOROLAC TROMETHAMINE 30 MG/ML
30 INJECTION, SOLUTION INTRAMUSCULAR; INTRAVENOUS ONCE
Status: COMPLETED | OUTPATIENT
Start: 2024-03-15 | End: 2024-03-15

## 2024-03-15 RX ORDER — OXYTOCIN/0.9 % SODIUM CHLORIDE 30/500 ML
PLASTIC BAG, INJECTION (ML) INTRAVENOUS AS NEEDED
Status: DISCONTINUED | OUTPATIENT
Start: 2024-03-15 | End: 2024-03-15 | Stop reason: SURG

## 2024-03-15 RX ORDER — KETOROLAC TROMETHAMINE 15 MG/ML
15 INJECTION, SOLUTION INTRAMUSCULAR; INTRAVENOUS EVERY 6 HOURS
Status: COMPLETED | OUTPATIENT
Start: 2024-03-15 | End: 2024-03-16

## 2024-03-15 RX ORDER — SODIUM CHLORIDE 9 MG/ML
40 INJECTION, SOLUTION INTRAVENOUS AS NEEDED
Status: DISCONTINUED | OUTPATIENT
Start: 2024-03-15 | End: 2024-03-18 | Stop reason: HOSPADM

## 2024-03-15 RX ORDER — BUPIVACAINE HYDROCHLORIDE 7.5 MG/ML
INJECTION, SOLUTION INTRASPINAL AS NEEDED
Status: DISCONTINUED | OUTPATIENT
Start: 2024-03-15 | End: 2024-03-15 | Stop reason: SURG

## 2024-03-15 RX ORDER — CITRIC ACID/SODIUM CITRATE 334-500MG
30 SOLUTION, ORAL ORAL ONCE
Status: COMPLETED | OUTPATIENT
Start: 2024-03-15 | End: 2024-03-15

## 2024-03-15 RX ORDER — SODIUM CHLORIDE 0.9 % (FLUSH) 0.9 %
10 SYRINGE (ML) INJECTION EVERY 12 HOURS SCHEDULED
Status: DISCONTINUED | OUTPATIENT
Start: 2024-03-15 | End: 2024-03-15 | Stop reason: HOSPADM

## 2024-03-15 RX ORDER — ACETAMINOPHEN 500 MG
1000 TABLET ORAL EVERY 6 HOURS
Status: COMPLETED | OUTPATIENT
Start: 2024-03-15 | End: 2024-03-16

## 2024-03-15 RX ORDER — OXYCODONE HYDROCHLORIDE AND ACETAMINOPHEN 5; 325 MG/1; MG/1
1 TABLET ORAL EVERY 4 HOURS PRN
Status: DISCONTINUED | OUTPATIENT
Start: 2024-03-15 | End: 2024-03-15 | Stop reason: HOSPADM

## 2024-03-15 RX ORDER — OXYCODONE HYDROCHLORIDE 5 MG/1
5 TABLET ORAL EVERY 4 HOURS PRN
Status: DISCONTINUED | OUTPATIENT
Start: 2024-03-15 | End: 2024-03-18 | Stop reason: HOSPADM

## 2024-03-15 RX ORDER — PROMETHAZINE HYDROCHLORIDE 12.5 MG/1
12.5 TABLET ORAL EVERY 6 HOURS PRN
Status: DISCONTINUED | OUTPATIENT
Start: 2024-03-15 | End: 2024-03-15 | Stop reason: HOSPADM

## 2024-03-15 RX ORDER — OXYTOCIN/0.9 % SODIUM CHLORIDE 30/500 ML
85 PLASTIC BAG, INJECTION (ML) INTRAVENOUS ONCE
Status: COMPLETED | OUTPATIENT
Start: 2024-03-15 | End: 2024-03-15

## 2024-03-15 RX ORDER — ALUMINA, MAGNESIA, AND SIMETHICONE 2400; 2400; 240 MG/30ML; MG/30ML; MG/30ML
15 SUSPENSION ORAL EVERY 4 HOURS PRN
Status: DISCONTINUED | OUTPATIENT
Start: 2024-03-15 | End: 2024-03-18 | Stop reason: HOSPADM

## 2024-03-15 RX ORDER — SODIUM CHLORIDE 9 MG/ML
40 INJECTION, SOLUTION INTRAVENOUS AS NEEDED
Status: DISCONTINUED | OUTPATIENT
Start: 2024-03-15 | End: 2024-03-15 | Stop reason: HOSPADM

## 2024-03-15 RX ORDER — ONDANSETRON 2 MG/ML
4 INJECTION INTRAMUSCULAR; INTRAVENOUS EVERY 6 HOURS PRN
Status: DISCONTINUED | OUTPATIENT
Start: 2024-03-15 | End: 2024-03-18 | Stop reason: HOSPADM

## 2024-03-15 RX ORDER — LABETALOL 200 MG/1
100 TABLET, FILM COATED ORAL 2 TIMES DAILY
Status: DISCONTINUED | OUTPATIENT
Start: 2024-03-15 | End: 2024-03-18 | Stop reason: HOSPADM

## 2024-03-15 RX ORDER — HYDROCORTISONE 25 MG/G
1 CREAM TOPICAL AS NEEDED
Status: DISCONTINUED | OUTPATIENT
Start: 2024-03-15 | End: 2024-03-18 | Stop reason: HOSPADM

## 2024-03-15 RX ORDER — CARBOPROST TROMETHAMINE 250 UG/ML
250 INJECTION, SOLUTION INTRAMUSCULAR AS NEEDED
Status: DISCONTINUED | OUTPATIENT
Start: 2024-03-15 | End: 2024-03-15 | Stop reason: HOSPADM

## 2024-03-15 RX ORDER — IBUPROFEN 600 MG/1
600 TABLET ORAL EVERY 6 HOURS
Status: DISCONTINUED | OUTPATIENT
Start: 2024-03-16 | End: 2024-03-18 | Stop reason: HOSPADM

## 2024-03-15 RX ORDER — SERTRALINE HYDROCHLORIDE 100 MG/1
100 TABLET, FILM COATED ORAL DAILY
Status: DISCONTINUED | OUTPATIENT
Start: 2024-03-16 | End: 2024-03-18 | Stop reason: HOSPADM

## 2024-03-15 RX ORDER — SODIUM CHLORIDE 0.9 % (FLUSH) 0.9 %
3-10 SYRINGE (ML) INJECTION AS NEEDED
Status: DISCONTINUED | OUTPATIENT
Start: 2024-03-15 | End: 2024-03-18 | Stop reason: HOSPADM

## 2024-03-15 RX ORDER — LIDOCAINE HYDROCHLORIDE 10 MG/ML
0.5 INJECTION, SOLUTION EPIDURAL; INFILTRATION; INTRACAUDAL; PERINEURAL ONCE AS NEEDED
Status: DISCONTINUED | OUTPATIENT
Start: 2024-03-15 | End: 2024-03-15 | Stop reason: HOSPADM

## 2024-03-15 RX ORDER — CALCIUM CARBONATE 500 MG/1
1 TABLET, CHEWABLE ORAL EVERY 4 HOURS PRN
Status: DISCONTINUED | OUTPATIENT
Start: 2024-03-15 | End: 2024-03-18 | Stop reason: HOSPADM

## 2024-03-15 RX ORDER — HYDROXYZINE HYDROCHLORIDE 25 MG/1
50 TABLET, FILM COATED ORAL EVERY 6 HOURS PRN
Status: DISCONTINUED | OUTPATIENT
Start: 2024-03-15 | End: 2024-03-18 | Stop reason: HOSPADM

## 2024-03-15 RX ORDER — ONDANSETRON 4 MG/1
4 TABLET, ORALLY DISINTEGRATING ORAL EVERY 6 HOURS PRN
Status: DISCONTINUED | OUTPATIENT
Start: 2024-03-15 | End: 2024-03-18 | Stop reason: HOSPADM

## 2024-03-15 RX ORDER — OXYCODONE HYDROCHLORIDE 10 MG/1
10 TABLET ORAL EVERY 4 HOURS PRN
Status: DISCONTINUED | OUTPATIENT
Start: 2024-03-15 | End: 2024-03-18 | Stop reason: HOSPADM

## 2024-03-15 RX ORDER — ACETAMINOPHEN 500 MG
1000 TABLET ORAL ONCE
Status: COMPLETED | OUTPATIENT
Start: 2024-03-15 | End: 2024-03-15

## 2024-03-15 RX ORDER — PHENYLEPHRINE HCL IN 0.9% NACL 1 MG/10 ML
SYRINGE (ML) INTRAVENOUS AS NEEDED
Status: DISCONTINUED | OUTPATIENT
Start: 2024-03-15 | End: 2024-03-15 | Stop reason: SURG

## 2024-03-15 RX ORDER — MISOPROSTOL 200 UG/1
800 TABLET ORAL AS NEEDED
Status: DISCONTINUED | OUTPATIENT
Start: 2024-03-15 | End: 2024-03-18 | Stop reason: HOSPADM

## 2024-03-15 RX ORDER — PROMETHAZINE HYDROCHLORIDE 12.5 MG/1
12.5 TABLET ORAL EVERY 4 HOURS PRN
Status: DISCONTINUED | OUTPATIENT
Start: 2024-03-15 | End: 2024-03-18 | Stop reason: HOSPADM

## 2024-03-15 RX ORDER — ACETAMINOPHEN 325 MG/1
650 TABLET ORAL EVERY 6 HOURS
Status: DISCONTINUED | OUTPATIENT
Start: 2024-03-16 | End: 2024-03-18 | Stop reason: HOSPADM

## 2024-03-15 RX ORDER — LEVOTHYROXINE SODIUM 88 UG/1
88 TABLET ORAL DAILY
Status: DISCONTINUED | OUTPATIENT
Start: 2024-03-16 | End: 2024-03-18 | Stop reason: HOSPADM

## 2024-03-15 RX ORDER — ONDANSETRON 2 MG/ML
INJECTION INTRAMUSCULAR; INTRAVENOUS AS NEEDED
Status: DISCONTINUED | OUTPATIENT
Start: 2024-03-15 | End: 2024-03-15 | Stop reason: SURG

## 2024-03-15 RX ORDER — FENTANYL CITRATE 50 UG/ML
INJECTION, SOLUTION INTRAMUSCULAR; INTRAVENOUS AS NEEDED
Status: DISCONTINUED | OUTPATIENT
Start: 2024-03-15 | End: 2024-03-15 | Stop reason: SURG

## 2024-03-15 RX ORDER — CARBOPROST TROMETHAMINE 250 UG/ML
250 INJECTION, SOLUTION INTRAMUSCULAR AS NEEDED
Status: DISCONTINUED | OUTPATIENT
Start: 2024-03-15 | End: 2024-03-18 | Stop reason: HOSPADM

## 2024-03-15 RX ORDER — SODIUM CHLORIDE 0.9 % (FLUSH) 0.9 %
3 SYRINGE (ML) INJECTION EVERY 12 HOURS SCHEDULED
Status: DISCONTINUED | OUTPATIENT
Start: 2024-03-15 | End: 2024-03-18 | Stop reason: HOSPADM

## 2024-03-15 RX ORDER — PROMETHAZINE HYDROCHLORIDE 25 MG/1
25 TABLET ORAL EVERY 6 HOURS PRN
Status: DISCONTINUED | OUTPATIENT
Start: 2024-03-15 | End: 2024-03-18 | Stop reason: HOSPADM

## 2024-03-15 RX ORDER — PROMETHAZINE HYDROCHLORIDE 12.5 MG/1
12.5 SUPPOSITORY RECTAL EVERY 6 HOURS PRN
Status: DISCONTINUED | OUTPATIENT
Start: 2024-03-15 | End: 2024-03-18 | Stop reason: HOSPADM

## 2024-03-15 RX ORDER — MISOPROSTOL 200 UG/1
800 TABLET ORAL AS NEEDED
Status: DISCONTINUED | OUTPATIENT
Start: 2024-03-15 | End: 2024-03-15 | Stop reason: HOSPADM

## 2024-03-15 RX ORDER — FAMOTIDINE 10 MG/ML
INJECTION, SOLUTION INTRAVENOUS AS NEEDED
Status: DISCONTINUED | OUTPATIENT
Start: 2024-03-15 | End: 2024-03-15 | Stop reason: SURG

## 2024-03-15 RX ORDER — METHYLERGONOVINE MALEATE 0.2 MG/ML
200 INJECTION INTRAVENOUS AS NEEDED
Status: DISCONTINUED | OUTPATIENT
Start: 2024-03-15 | End: 2024-03-18 | Stop reason: HOSPADM

## 2024-03-15 RX ADMIN — BUPIVACAINE HYDROCHLORIDE IN DEXTROSE 1.4 ML: 7.5 INJECTION, SOLUTION SUBARACHNOID at 12:18

## 2024-03-15 RX ADMIN — ACETAMINOPHEN 1000 MG: 500 TABLET ORAL at 11:40

## 2024-03-15 RX ADMIN — FENTANYL CITRATE 20 MCG: 50 INJECTION, SOLUTION INTRAMUSCULAR; INTRAVENOUS at 12:18

## 2024-03-15 RX ADMIN — SODIUM CITRATE AND CITRIC ACID MONOHYDRATE 30 ML: 500; 334 SOLUTION ORAL at 12:07

## 2024-03-15 RX ADMIN — OXYTOCIN 3 UNITS: 10 INJECTION INTRAVENOUS at 12:36

## 2024-03-15 RX ADMIN — ONDANSETRON 4 MG: 2 INJECTION INTRAMUSCULAR; INTRAVENOUS at 12:12

## 2024-03-15 RX ADMIN — SODIUM CHLORIDE, POTASSIUM CHLORIDE, SODIUM LACTATE AND CALCIUM CHLORIDE 125 ML/HR: 600; 310; 30; 20 INJECTION, SOLUTION INTRAVENOUS at 14:03

## 2024-03-15 RX ADMIN — FAMOTIDINE 20 MG: 10 INJECTION, SOLUTION INTRAVENOUS at 12:12

## 2024-03-15 RX ADMIN — ACETAMINOPHEN 1000 MG: 500 TABLET ORAL at 18:55

## 2024-03-15 RX ADMIN — KETOROLAC TROMETHAMINE 30 MG: 30 INJECTION, SOLUTION INTRAMUSCULAR; INTRAVENOUS at 14:41

## 2024-03-15 RX ADMIN — KETOROLAC TROMETHAMINE 15 MG: 15 INJECTION, SOLUTION INTRAMUSCULAR; INTRAVENOUS at 21:58

## 2024-03-15 RX ADMIN — ACETAMINOPHEN 1000 MG: 500 TABLET ORAL at 23:54

## 2024-03-15 RX ADMIN — Medication 200 MCG: at 12:34

## 2024-03-15 RX ADMIN — MORPHINE SULFATE 150 MCG: 0.5 INJECTION, SOLUTION EPIDURAL; INTRATHECAL; INTRAVENOUS at 12:18

## 2024-03-15 RX ADMIN — SODIUM CHLORIDE 2 G: 900 INJECTION INTRAVENOUS at 11:50

## 2024-03-15 RX ADMIN — LABETALOL HYDROCHLORIDE 100 MG: 200 TABLET, FILM COATED ORAL at 22:20

## 2024-03-15 RX ADMIN — Medication 100 MCG: at 12:26

## 2024-03-15 RX ADMIN — DIPHENHYDRAMINE HYDROCHLORIDE 25 MG: 25 CAPSULE ORAL at 22:19

## 2024-03-15 RX ADMIN — Medication 85 ML/HR: at 14:01

## 2024-03-15 RX ADMIN — SODIUM CHLORIDE, POTASSIUM CHLORIDE, SODIUM LACTATE AND CALCIUM CHLORIDE 125 ML/HR: 600; 310; 30; 20 INJECTION, SOLUTION INTRAVENOUS at 11:50

## 2024-03-15 RX ADMIN — METOCLOPRAMIDE 10 MG: 5 INJECTION, SOLUTION INTRAMUSCULAR; INTRAVENOUS at 12:22

## 2024-03-15 RX ADMIN — SODIUM CHLORIDE, POTASSIUM CHLORIDE, SODIUM LACTATE AND CALCIUM CHLORIDE 1000 ML: 600; 310; 30; 20 INJECTION, SOLUTION INTRAVENOUS at 11:10

## 2024-03-15 RX ADMIN — SODIUM CHLORIDE, POTASSIUM CHLORIDE, SODIUM LACTATE AND CALCIUM CHLORIDE: 600; 310; 30; 20 INJECTION, SOLUTION INTRAVENOUS at 12:33

## 2024-03-15 RX ADMIN — MIDAZOLAM HYDROCHLORIDE 1 MG: 1 INJECTION, SOLUTION INTRAMUSCULAR; INTRAVENOUS at 12:12

## 2024-03-15 RX ADMIN — Medication 100 MCG: at 12:20

## 2024-03-15 RX ADMIN — Medication 400 ML: at 12:51

## 2024-03-15 RX ADMIN — Medication 10 ML: at 11:50

## 2024-03-15 RX ADMIN — SODIUM CHLORIDE, POTASSIUM CHLORIDE, SODIUM LACTATE AND CALCIUM CHLORIDE 125 ML/HR: 600; 310; 30; 20 INJECTION, SOLUTION INTRAVENOUS at 23:55

## 2024-03-15 RX ADMIN — OXYTOCIN 3 UNITS: 10 INJECTION INTRAVENOUS at 12:40

## 2024-03-15 NOTE — H&P
ALEX Cervantes  Obstetric History and Physical    CC: RCD and BS    SUBJECTIVE:     Patient is a 33 y.o. female  currently at 37w0d, who presents with pregnancy c/b AMA, prior CD, T2DM, , and history of MI (NSTEMI) for RCD and BS. .      Prenatal Information:  Prenatal Results       Initial Prenatal Labs       Test Value Reference Range Date Time    Hemoglobin  12.4 g/dL 12.0 - 15.9 23 0948    Hematocrit  38.0 % 34.0 - 46.6 23 0948    Platelets  179 10*3/mm3 140 - 450 23 0948    Rubella IgG  4.37 index Immune >0.99 23 0948    Hepatitis B SAg  Non-Reactive  Non-Reactive 23 0948    Hepatitis C Ab  Non-Reactive  Non-Reactive 23 0948    RPR        T. Pallidum Ab   Non-Reactive  Non-Reactive 24 1444    ABO  O   24 1444    Rh  Positive   24 1444    Antibody Screen  Negative   23 0948    HIV  Non-Reactive  Non-Reactive 23 0948    Urine Culture  50,000 CFU/mL Normal Urogenital Laura   24 1411       >100,000 CFU/mL Normal Urogenital Laura   23 0948    Gonorrhea  Negative  Negative 23 0948    Chlamydia  Negative  Negative 23 0948    TSH  1.950 uIU/mL 0.270 - 4.200 23 1253    HgB A1c   6.70 % 4.80 - 5.60 23 0948      ^ 6.2 % 4.4 - 6.6 23 1154    Varicella IgG        HgB Electrophoresis         Cystic fibrosis                   Fetal testing        Test Value Reference Range Date Time    NIPT        MSAFP        AFP-4                  2nd and 3rd Trimester       Test Value Reference Range Date Time    Hemoglobin (repeated)  12.3 g/dL 12.0 - 15.9 03/15/24 1113       12.5 g/dL 12.0 - 15.9 24 1444    Hematocrit (repeated)  37.5 % 34.0 - 46.6 03/15/24 1113       38.1 % 34.0 - 46.6 24 1444    Platelets   128 10*3/mm3 140 - 450 03/15/24 1113       135 10*3/mm3 140 - 450 24 1444       179 10*3/mm3 140 - 450 23 0948    GCT        Antibody Screen (repeated)  Negative   24 1444    Third Trimester  syphilis screen (repeated)         GTT Fasting        GTT 1 Hr        GTT 2 Hr        GTT 3 Hr        Group B Strep                  Other testing        Test Value Reference Range Date Time    Parvo IgG         CMV IgG                   Drug Screening       Test Value Reference Range Date Time    Amphetamine Screen  Negative  Negative 08/30/23 0948    Barbiturate Screen  Negative  Negative 08/30/23 0948    Benzodiazepine Screen  Negative  Negative 08/30/23 0948    Methadone Screen  Negative  Negative 08/30/23 0948    Phencyclidine Screen  Negative  Negative 08/30/23 0948    Opiates Screen  Negative  Negative 08/30/23 0948    THC Screen  Negative  Negative 08/30/23 0948    Cocaine Screen  Negative  Negative 08/30/23 0948    Propoxyphene Screen  Negative  Negative 08/30/23 0948    Buprenorphine Screen  Negative  Negative 08/30/23 0948    Methamphetamine Screen  Negative  Negative 08/30/23 0948    Oxycodone Screen  Negative  Negative 08/30/23 0948    Tricyclic Antidepressants Screen  Negative  Negative 08/30/23 0948              Legend    ^: Historical                          External Prenatal Results       Pregnancy Outside Results - Transcribed From Office Records - See Scanned Records For Details       Test Value Date Time    ABO  O  03/13/24 1444    Rh  Positive  03/13/24 1444    Antibody Screen  Negative  03/13/24 1444       Negative  08/30/23 0948    Varicella IgG       Rubella  4.37 index 08/30/23 0948    Hgb  12.3 g/dL 03/15/24 1113       12.5 g/dL 03/13/24 1444       12.4 g/dL 08/30/23 0948    Hct  37.5 % 03/15/24 1113       38.1 % 03/13/24 1444       38.0 % 08/30/23 0948    Glucose Fasting GTT       Glucose Tolerance Test 1 hour       Glucose Tolerance Test 3 hour       Gonorrhea (discrete)  Negative  08/30/23 0948    Chlamydia (discrete)  Negative  08/30/23 0948    RPR       VDRL       Syphilis Antibody       HBsAg  Non-Reactive  08/30/23 0948    Herpes Simplex Virus PCR       Herpes Simplex VIrus Culture        HIV  Non-Reactive  23 0948    Hep C RNA Quant PCR       Hep C Antibody  Non-Reactive  23 0948    AFP       Group B Strep       GBS Susceptibility to Clindamycin       GBS Susceptibility to Erythromycin       Fetal Fibronectin       Genetic Testing, Maternal Blood                 Drug Screening       Test Value Date Time    Urine Drug Screen       Amphetamine Screen  Negative  23 0948    Barbiturate Screen  Negative  23 0948    Benzodiazepine Screen  Negative  23 0948    Methadone Screen  Negative  23 0948    Phencyclidine Screen  Negative  23 0948    Opiates Screen  Negative  23 0948    THC Screen  Negative  23 0948    Cocaine Screen       Propoxyphene Screen  Negative  23 0948    Buprenorphine Screen  Negative  23 0948    Methamphetamine Screen       Oxycodone Screen  Negative  23 0948    Tricyclic Antidepressants Screen  Negative  23 0948              Legend    ^: Historical                             OB History:                     OB History    Para Term  AB Living   3 1 1 0 1 1   SAB IAB Ectopic Molar Multiple Live Births   1 0 0 0 0 1      # Outcome Date GA Lbr Rocky/2nd Weight Sex Type Anes PTL Lv   3 Current            2 SAB 21 3w0d    SAB         Birth Comments: infection around right ovary - had surgery   1 Term 18 38w0d  4054 g (8 lb 15 oz) F CS-LTranv EPI  BEATRIS      Complications: Postpartum Hemorrhage, Type 2 diabetes mellitus, Chronic hypertension, PPH (postpartum hemorrhage)      Obstetric Comments   Fob #1 - Pregnancy #1- #3      Allergies:                        Allergies   Allergen Reactions    Gluten Meal Hives    Sulfa Antibiotics Swelling and Rash      Past Medical History: Past Medical History:   Diagnosis Date    Chest pressure     Chronic hypertension     labetalol    Gluten intolerance     Hemorrhage after delivery of fetus 2018    History of myocardial infarct at age less than 60  years 2023    s/p heart cath -- all clear    Hypothyroidism     Type 2 diabetes mellitus 2009    on insulin      Past Surgical History Past Surgical History:   Procedure Laterality Date    CARDIAC CATHETERIZATION  04/15/2023     SECTION      CHOLECYSTECTOMY      ECHO - CONVERTED  2023    ECHO - CONVERTED  2023    EF 60%. Trace  RVSP- 13 mmHg    EXPLORATORY LAPAROTOMY      HAND SURGERY Right     TONSILLECTOMY AND ADENOIDECTOMY        Family History: Family History   Problem Relation Age of Onset    Hypertension Mother     Hypertension Father     Hypertension Maternal Grandmother     Diabetes Maternal Grandmother     Hypertension Maternal Grandfather     Diabetes Maternal Grandfather     Heart disease Maternal Grandfather     Cancer Paternal Grandmother     Cancer Paternal Grandfather     No Known Problems Daughter       Social History:  reports that she has never smoked. She has never used smokeless tobacco.   reports that she does not currently use alcohol.   reports no history of drug use.    General ROS: Pertinent items are noted in HPI, all other systems reviewed and negative   Medications: Dexcom G6 , Dexcom G6 Sensor, Dexcom G6 Transmitter, Insulin Pen Needle, Magnesium-Zinc, Pen Needles, amoxicillin-clavulanate, aspirin, cetirizine, doxylamine, freestyle, glucose blood, insulin aspart, insulin detemir, labetalol, levothyroxine, metFORMIN, prenatal vitamin, and sertraline       Objective       Vital Signs Range for the last 24 hours  Temperature: Temp:  [98.7 °F (37.1 °C)-98.9 °F (37.2 °C)] 98.7 °F (37.1 °C)   BP: BP: (121)/(85) 121/85   Pulse: Heart Rate:  [87-97] 96   Respirations: Resp:  [16-18] 16   SPO2: SpO2:  [97 %-99 %] 99 %               Physical Examination: General appearance - alert, well appearing, and in no distress  Chest - clear to auscultation, no wheezes, rales or rhonchi, symmetric air entry  Heart - normal rate, regular rhythm, normal S1, S2, no murmurs,  rubs, clicks or gallops  Abdomen - Soft, gravid, nontender  Extremities - pedal edema 1 +        Fetal Heart Rate Assessment           Baseline:  normal   Variability:  moderate   Accels:  present   Decels:  absent   Tracing Category:  1     Uterine Assessment   Method:     Frequency (min):     Ctx Count in 10 min:       Laboratory Results:  WBC   Date Value Ref Range Status   03/15/2024 8.90 3.40 - 10.80 10*3/mm3 Final     RBC   Date Value Ref Range Status   03/15/2024 3.99 3.77 - 5.28 10*6/mm3 Final     Hemoglobin   Date Value Ref Range Status   03/15/2024 12.3 12.0 - 15.9 g/dL Final     Hematocrit   Date Value Ref Range Status   03/15/2024 37.5 34.0 - 46.6 % Final     MCV   Date Value Ref Range Status   03/15/2024 94.0 79.0 - 97.0 fL Final     MCH   Date Value Ref Range Status   03/15/2024 30.8 26.6 - 33.0 pg Final     MCHC   Date Value Ref Range Status   03/15/2024 32.8 31.5 - 35.7 g/dL Final     RDW   Date Value Ref Range Status   03/15/2024 13.6 12.3 - 15.4 % Final     RDW-SD   Date Value Ref Range Status   03/15/2024 46.5 37.0 - 54.0 fl Final     MPV   Date Value Ref Range Status   03/15/2024 12.4 (H) 6.0 - 12.0 fL Final     Platelets   Date Value Ref Range Status   03/15/2024 128 (L) 140 - 450 10*3/mm3 Final             Assessment/Plan:   IUP 37w0d for RCD and BS    1.Admit and proceed with scheduled procedure  2.T2DM: cont FSBS post delivery. Will determine need for insulin  3.h/o NSTEMI: cont BB. Monitor closely  4. FWB reassuring  5. PP DVT ppx      Brittany Moody MD  3/15/2024  12:11 EDT

## 2024-03-15 NOTE — ANESTHESIA PROCEDURE NOTES
Spinal Block      Patient reassessed immediately prior to procedure    Patient location during procedure: OR  Indication:at surgeon's request  Performed By  CRNA/MARGARITA: Mirna Rios CRNA  Preanesthetic Checklist  Completed: patient identified, IV checked, site marked, risks and benefits discussed, surgical consent, monitors and equipment checked, pre-op evaluation and timeout performed  Spinal Block Prep:  Patient Position:sitting  Sterile Tech:cap, gloves, mask and sterile barriers  Prep:Betadine  Patient Monitoring:blood pressure monitoring, continuous pulse oximetry and EKG    Spinal Block Procedure  Approach:midline  Guidance:palpation technique  Location:L3-L4  Needle Type:Obed  Needle Gauge:25 G  Placement of Spinal needle event:cerebrospinal fluid aspirated  Paresthesia: no  Fluid Appearance:clear     Post Assessment  Patient Tolerance:patient tolerated the procedure well with no apparent complications  Complications no

## 2024-03-15 NOTE — ANESTHESIA PREPROCEDURE EVALUATION
Anesthesia Evaluation     Patient summary reviewed and Nursing notes reviewed   NPO Solid Status: > 8 hours  NPO Liquid Status: > 2 hours           Airway   Mallampati: II  TM distance: >3 FB  Neck ROM: full  Dental      Pulmonary - negative pulmonary ROS   Cardiovascular     (+) hypertension less than 2 medications      Neuro/Psych- negative ROS  GI/Hepatic/Renal/Endo    (+) diabetes mellitus type 2, thyroid problem hypothyroidism    Musculoskeletal (-) negative ROS    Abdominal    Substance History - negative use     OB/GYN    (+) Pregnant, pregnancy induced hypertension        Other - negative ROS                   Anesthesia Plan    ASA 3     ITN and spinal       Anesthetic plan, risks, benefits, and alternatives have been provided, discussed and informed consent has been obtained with: patient.    Use of blood products discussed with patient .    Plan discussed with attending.    CODE STATUS:    Level Of Support Discussed With: Patient  Code Status (Patient has no pulse and is not breathing): CPR (Attempt to Resuscitate)  Medical Interventions (Patient has pulse or is breathing): Full Support

## 2024-03-15 NOTE — OP NOTE
"[facility]   Section Operative Note    Pre-Operative Dx:   1.  IUP at Gestational Age: 37w0d  weeks    2. T2DM  3. H/o NSTEMI  4. Previous CD  5. Desires Sterilization        Postoperative dx:    1.  Same     Procedure: Procedure(s):   SECTION REPEAT WITH SALPINGECTOMY   Surgeon/Assistant: Surgeon(s):  Brittany Moody MD         Anesthesia:  Anesthesiologist: Spinal  Anesthesiologist: Jovan Robbins DO  CRNA: Mirna Rios CRNA         QBL:  680 mL        IV Fluids: 1200 mls.   UOP: 150 mls.    I/O this shift:  In: 1200 [I.V.:1200]  Out: 830 [Urine:150; Blood:680]   Antibiotics: cefazolin (Ancef)     Infant:           Gender: female  infant    Weight: 3758 g (8 lb 4.6 oz)     Apgars:   @ 1 minute /       @ 5 minutes    Cord gases: Venous:  No results found for: \"PHCVEN\", \"BECVEN\"     Arterial:  No results found for: \"PHCART\", \"BECART\"     Indication for C/Section:  Prior C/S;Gestational HTN;GDM with Insulin;Diabetes (Type I or II);Chronic HTN           Priority for C/Section:  routine      Procedure Details:   The patient was taken to the operating room after risks and benefits have been reviewed. The consent was reviewed and had been signed. Time Out was performed. She was prepped and draped in the normal sterile fashion in the dorsal supine position and a leftward tilt.  A Pfannenstiel skin incision made 2 cm above the pubic symphysis and carried down to the underlying layer of fascia with the scalpel.  The fascia was nicked in the midline and the incision was extended laterally with Martinez scissors.  The anterior aspect of the incision was grasped with Kocher clamps x2 and elevated and the underlying rectus muscles were dissected off sharply and bluntly.  The inferior aspect of the incision was treated similarly.  The peritoneum was identified and entered bluntly the incision was extended with lateral traction.  A bladder blade was inserted.  The lower uterine segment was " identified and incised in a transverse fashion with the scalpel.  The uterine cavity was entered bluntly and the incision was extended with cephalocaudad traction.  The fetus was then delivered atraumatically.  Cord was clamped and cut after 60 second delay and the infant was handed off to DRT staff for evaluation.  Cord blood and cord gases were obtained.  The placenta was delivered via uterine massage.  The uterus was then exteriorized and cleared of all clot and debris with clean laparotomy sponges.  The hysterotomy was closed in a single layer with a #1 chromic in a running locking fashion.    The posterior cul-de-sac was irrigated and aspirated.  The hysterotomy was again inspected and noted to be hemostatic.  The left fallopian tubes was identified and followed out to the fimbria. Tube was removed in its entirety with EnSeal device . Hemostasis noted at the seal line. The same procedure was performed on the right. Tubes handed off for pathologic review.The uterus was returned to the abdomen.  The paracolic gutters were irrigated and aspirated.  Hysterotomy was inspected for third time and noted to be hemostatic.  The peritoneum was closed with 2-0 chromic in a running fashion.  The rectus muscles underlying the fascia were made hemostatic with Bovie electrocautery.  The fascia was then closed with 1 Vicryl in a running stitch.  The subcutaneous tissues were irrigated aspirated and made hemostatic with Bovie electrocautery.  The subcutaneous tissues were reapproximated with 3-0 Vicryl and the skin was closed with 4-0 Monocryl in a subcuticular fashion and sealed with Dermabond.  All counts were correct and the patient was taken to the recovery room in stable condition.          Complications:   None     Specimens: bilateral tubal segments     Disposition:   Mother to Mother Baby/Postpartum  in stable condition currently.   Baby to NICU  in stable condition currently.       Brittany Moody MD  3/15/2024  12:56  EDT

## 2024-03-15 NOTE — ANESTHESIA POSTPROCEDURE EVALUATION
Patient: Renetta Monte    Procedure Summary       Date: 03/15/24 Room / Location: [facility] LABOR DELIVERY   [facility]OR DELIVERY    Anesthesia Start: 1211 Anesthesia Stop: 130    Procedure:  SECTION REPEAT WITH SALPINGECTOMY (Bilateral) Diagnosis:     Surgeons: Brittany Moody MD Provider: Jovan Robbins DO    Anesthesia Type: ITN, spinal ASA Status: 3            Anesthesia Type: ITN, spinal    Vitals  Vitals Value Taken Time   /55 03/15/24 1258   Temp 98.6 °F (37 °C) 03/15/24 1258   Pulse 85 03/15/24 1302   Resp 16 03/15/24 1258   SpO2 97 % 03/15/24 1302   Vitals shown include unfiled device data.        Post Anesthesia Care and Evaluation    Patient location during evaluation: bedside  Patient participation: complete - patient participated  Level of consciousness: awake and alert  Pain score: 0  Pain management: adequate    Airway patency: patent  Anesthetic complications: No anesthetic complications    Cardiovascular status: acceptable  Respiratory status: acceptable  Hydration status: acceptable

## 2024-03-16 LAB
BASOPHILS # BLD AUTO: 0.01 10*3/MM3 (ref 0–0.2)
BASOPHILS NFR BLD AUTO: 0.1 % (ref 0–1.5)
DEPRECATED RDW RBC AUTO: 48.9 FL (ref 37–54)
EOSINOPHIL # BLD AUTO: 0.14 10*3/MM3 (ref 0–0.4)
EOSINOPHIL NFR BLD AUTO: 1.6 % (ref 0.3–6.2)
ERYTHROCYTE [DISTWIDTH] IN BLOOD BY AUTOMATED COUNT: 13.8 % (ref 12.3–15.4)
GLUCOSE BLDC GLUCOMTR-MCNC: 108 MG/DL (ref 70–130)
GLUCOSE BLDC GLUCOMTR-MCNC: 113 MG/DL (ref 70–130)
GLUCOSE BLDC GLUCOMTR-MCNC: 116 MG/DL (ref 70–130)
GLUCOSE BLDC GLUCOMTR-MCNC: 96 MG/DL (ref 70–130)
HCT VFR BLD AUTO: 33.2 % (ref 34–46.6)
HGB BLD-MCNC: 10.7 G/DL (ref 12–15.9)
IMM GRANULOCYTES # BLD AUTO: 0.02 10*3/MM3 (ref 0–0.05)
IMM GRANULOCYTES NFR BLD AUTO: 0.2 % (ref 0–0.5)
LYMPHOCYTES # BLD AUTO: 1.7 10*3/MM3 (ref 0.7–3.1)
LYMPHOCYTES NFR BLD AUTO: 19.7 % (ref 19.6–45.3)
MCH RBC QN AUTO: 30.8 PG (ref 26.6–33)
MCHC RBC AUTO-ENTMCNC: 32.2 G/DL (ref 31.5–35.7)
MCV RBC AUTO: 95.7 FL (ref 79–97)
MONOCYTES # BLD AUTO: 0.57 10*3/MM3 (ref 0.1–0.9)
MONOCYTES NFR BLD AUTO: 6.6 % (ref 5–12)
NEUTROPHILS NFR BLD AUTO: 6.21 10*3/MM3 (ref 1.7–7)
NEUTROPHILS NFR BLD AUTO: 71.8 % (ref 42.7–76)
NRBC BLD AUTO-RTO: 0 /100 WBC (ref 0–0.2)
PLATELET # BLD AUTO: 98 10*3/MM3 (ref 140–450)
PMV BLD AUTO: 12.7 FL (ref 6–12)
RBC # BLD AUTO: 3.47 10*6/MM3 (ref 3.77–5.28)
WBC NRBC COR # BLD AUTO: 8.65 10*3/MM3 (ref 3.4–10.8)

## 2024-03-16 PROCEDURE — 85025 COMPLETE CBC W/AUTO DIFF WBC: CPT | Performed by: OBSTETRICS & GYNECOLOGY

## 2024-03-16 PROCEDURE — 82948 REAGENT STRIP/BLOOD GLUCOSE: CPT

## 2024-03-16 PROCEDURE — 25010000002 KETOROLAC TROMETHAMINE PER 15 MG: Performed by: OBSTETRICS & GYNECOLOGY

## 2024-03-16 PROCEDURE — 25010000002 FUROSEMIDE PER 20 MG: Performed by: OBSTETRICS & GYNECOLOGY

## 2024-03-16 RX ORDER — FUROSEMIDE 10 MG/ML
20 INJECTION INTRAMUSCULAR; INTRAVENOUS ONCE
Status: COMPLETED | OUTPATIENT
Start: 2024-03-16 | End: 2024-03-16

## 2024-03-16 RX ADMIN — ACETAMINOPHEN 650 MG: 325 TABLET ORAL at 18:56

## 2024-03-16 RX ADMIN — Medication 3 ML: at 22:00

## 2024-03-16 RX ADMIN — SERTRALINE HYDROCHLORIDE 100 MG: 100 TABLET ORAL at 07:56

## 2024-03-16 RX ADMIN — IBUPROFEN 600 MG: 600 TABLET, FILM COATED ORAL at 22:00

## 2024-03-16 RX ADMIN — METFORMIN HYDROCHLORIDE 1000 MG: 500 TABLET, FILM COATED ORAL at 07:55

## 2024-03-16 RX ADMIN — ACETAMINOPHEN 1000 MG: 500 TABLET ORAL at 06:21

## 2024-03-16 RX ADMIN — KETOROLAC TROMETHAMINE 15 MG: 15 INJECTION, SOLUTION INTRAMUSCULAR; INTRAVENOUS at 16:21

## 2024-03-16 RX ADMIN — KETOROLAC TROMETHAMINE 15 MG: 15 INJECTION, SOLUTION INTRAMUSCULAR; INTRAVENOUS at 07:55

## 2024-03-16 RX ADMIN — KETOROLAC TROMETHAMINE 15 MG: 15 INJECTION, SOLUTION INTRAMUSCULAR; INTRAVENOUS at 03:04

## 2024-03-16 RX ADMIN — FUROSEMIDE 20 MG: 10 INJECTION, SOLUTION INTRAMUSCULAR; INTRAVENOUS at 13:14

## 2024-03-16 RX ADMIN — LABETALOL HYDROCHLORIDE 100 MG: 200 TABLET, FILM COATED ORAL at 07:56

## 2024-03-16 RX ADMIN — PRENATAL VITAMINS-IRON FUMARATE 27 MG IRON-FOLIC ACID 0.8 MG TABLET 1 TABLET: at 07:56

## 2024-03-16 RX ADMIN — LABETALOL HYDROCHLORIDE 100 MG: 200 TABLET, FILM COATED ORAL at 22:00

## 2024-03-16 RX ADMIN — CETIRIZINE HYDROCHLORIDE 10 MG: 10 TABLET, FILM COATED ORAL at 22:00

## 2024-03-16 RX ADMIN — ACETAMINOPHEN 1000 MG: 500 TABLET ORAL at 13:14

## 2024-03-16 NOTE — PROGRESS NOTES
3/16/2024    Name:Renetta Monte    MR#:[id number]     PROGRESS NOTE:  Post-Op 1 S/P  w/ BS       Subjective   33 y.o. yo Female  s/p CS at 37w0d doing well. Pain well controlled, lochia appropriate, tolerating diet.   No CP, SOB. C/o painful swelling of feet.       Term pregnancy        Objective    Vitals  Temp:  Temp:  [98.1 °F (36.7 °C)-98.9 °F (37.2 °C)] 98.1 °F (36.7 °C)  Temp src: Oral  BP:  BP: ()/(55-85) 117/60  Pulse:  Heart Rate:  [64-97] 68  RR:   Resp:  [16-18] 16    General Awake, alert, no distress  Abdomen Soft, non-distended, fundus firm, below umbilicus, appropriately tender  Incision  Intact, no erythema or exudate  Extremities Calves NT bilaterally     I/O last 3 completed shifts:  In: 1200 [I.V.:1200]  Out: 2105 [Urine:1425; Blood:680]    LABS:   Lab Results   Component Value Date    WBC 8.65 2024    HGB 10.7 (L) 2024    HCT 33.2 (L) 2024    MCV 95.7 2024    PLT 98 (L) 2024     Component      Latest Ref Rng 3/15/2024 3/16/2024   Glucose      70 - 130 mg/dL 117  108    Glucose       81         Infant: female       Assessment   1.  POD 1 from  Section, doing well  2. T2DM: FSBS wnl since delivery on metformin  3. H/o NSTEMI: on beta blocker. No sx of cardiac disease  4. Hypothyroidism  5. obesity      Plan:    Routine cares  Continue home meds  Continue FSBS  Ppx LMWH while inpt for obesity    Lasix ordered          Veena Oakes MD  3/16/2024 08:52 EDT

## 2024-03-16 NOTE — ANESTHESIA POSTPROCEDURE EVALUATION
Patient: Renetta Monte    Procedure Summary       Date: 03/15/24 Room / Location: [facility] LABOR DELIVERY   [facility] DELIV[facility]    Anesthesia Start: 1211 Anesthesia Stop: 1302    Procedure:  SECTION REPEAT WITH SALPINGECTOMY (Bilateral) Diagnosis:     Surgeons: Brittany Moody MD Provider: Jovan Robbins DO    Anesthesia Type: ITN, spinal ASA Status: 3            Anesthesia Type: ITN, spinal    Vitals  Vitals Value Taken Time   BP 98/59 24 0750   Temp 98.1 °F (36.7 °C) 24 0750   Pulse 80 24 0750   Resp 18 24 0750   SpO2 99 % 03/15/24 1505   Vitals shown include unfiled device data.        Post Anesthesia Care and Evaluation    Patient location during evaluation: bedside  Patient participation: complete - patient participated  Level of consciousness: awake and awake and alert  Pain score: 0  Pain management: satisfactory to patient    Airway patency: patent  Anesthetic complications: No anesthetic complications  PONV Status: none  Cardiovascular status: acceptable, hemodynamically stable and stable  Respiratory status: acceptable  Hydration status: stable  Post Neuraxial Block status: Motor and sensory function returned to baseline and No signs or symptoms of PDPH

## 2024-03-16 NOTE — PLAN OF CARE
Problem: Adult Inpatient Plan of Care  Goal: Plan of Care Review  Outcome: Ongoing, Progressing  Goal: Patient-Specific Goal (Individualized)  Outcome: Ongoing, Progressing  Goal: Absence of Hospital-Acquired Illness or Injury  Outcome: Ongoing, Progressing  Intervention: Identify and Manage Fall Risk  Recent Flowsheet Documentation  Taken 3/16/2024 0400 by Rebeka Hammer RN  Safety Promotion/Fall Prevention: safety round/check completed  Taken 3/16/2024 0200 by Rebeka Hammer RN  Safety Promotion/Fall Prevention: safety round/check completed  Taken 3/16/2024 0000 by Rebeka Hammer RN  Safety Promotion/Fall Prevention: safety round/check completed  Taken 3/15/2024 2200 by Rebeka Hammer RN  Safety Promotion/Fall Prevention: safety round/check completed  Taken 3/15/2024 2000 by Rebeka Hammer RN  Safety Promotion/Fall Prevention: safety round/check completed  Intervention: Prevent Skin Injury  Recent Flowsheet Documentation  Taken 3/15/2024 2000 by Rebeka Hammer RN  Body Position: position changed independently  Intervention: Prevent and Manage VTE (Venous Thromboembolism) Risk  Recent Flowsheet Documentation  Taken 3/15/2024 2000 by Rebeka Hammer RN  Activity Management: bedrest  VTE Prevention/Management:   bilateral   sequential compression devices on  Goal: Optimal Comfort and Wellbeing  Outcome: Ongoing, Progressing  Intervention: Provide Person-Centered Care  Recent Flowsheet Documentation  Taken 3/15/2024 2000 by Rebeka Hammer RN  Trust Relationship/Rapport:   care explained   choices provided   questions answered   questions encouraged  Goal: Readiness for Transition of Care  Outcome: Ongoing, Progressing     Problem: Adjustment to Role Transition (Postpartum  Delivery)  Goal: Successful Maternal Role Transition  Outcome: Ongoing, Progressing     Problem: Bleeding (Postpartum  Delivery)  Goal: Hemostasis  Outcome: Ongoing,  Progressing     Problem: Infection (Postpartum  Delivery)  Goal: Absence of Infection Signs and Symptoms  Outcome: Ongoing, Progressing     Problem: Pain (Postpartum  Delivery)  Goal: Acceptable Pain Control  Outcome: Ongoing, Progressing     Problem: Postoperative Nausea and Vomiting (Postpartum  Delivery)  Goal: Nausea and Vomiting Relief  Outcome: Ongoing, Progressing     Problem: Postoperative Urinary Retention (Postpartum  Delivery)  Goal: Effective Urinary Elimination  Outcome: Ongoing, Progressing   Goal Outcome Evaluation:

## 2024-03-17 LAB
BACTERIA UR QL AUTO: ABNORMAL /HPF
BILIRUB UR QL STRIP: NEGATIVE
CLARITY UR: CLEAR
COLOR UR: YELLOW
GLUCOSE BLDC GLUCOMTR-MCNC: 144 MG/DL (ref 70–130)
GLUCOSE BLDC GLUCOMTR-MCNC: 180 MG/DL (ref 70–130)
GLUCOSE BLDC GLUCOMTR-MCNC: 86 MG/DL (ref 70–130)
GLUCOSE BLDC GLUCOMTR-MCNC: 90 MG/DL (ref 70–130)
GLUCOSE UR STRIP-MCNC: NEGATIVE MG/DL
HGB UR QL STRIP.AUTO: ABNORMAL
HYALINE CASTS UR QL AUTO: ABNORMAL /LPF
KETONES UR QL STRIP: NEGATIVE
LEUKOCYTE ESTERASE UR QL STRIP.AUTO: NEGATIVE
NITRITE UR QL STRIP: NEGATIVE
PH UR STRIP.AUTO: 7 [PH] (ref 5–8)
PROT UR QL STRIP: NEGATIVE
RBC # UR STRIP: ABNORMAL /HPF
REF LAB TEST METHOD: ABNORMAL
SP GR UR STRIP: 1.01 (ref 1–1.03)
SQUAMOUS #/AREA URNS HPF: ABNORMAL /HPF
UROBILINOGEN UR QL STRIP: ABNORMAL
WBC # UR STRIP: ABNORMAL /HPF

## 2024-03-17 PROCEDURE — 81001 URINALYSIS AUTO W/SCOPE: CPT | Performed by: OBSTETRICS & GYNECOLOGY

## 2024-03-17 PROCEDURE — 82948 REAGENT STRIP/BLOOD GLUCOSE: CPT

## 2024-03-17 RX ADMIN — LABETALOL HYDROCHLORIDE 100 MG: 200 TABLET, FILM COATED ORAL at 08:40

## 2024-03-17 RX ADMIN — CETIRIZINE HYDROCHLORIDE 10 MG: 10 TABLET, FILM COATED ORAL at 21:18

## 2024-03-17 RX ADMIN — IBUPROFEN 600 MG: 600 TABLET, FILM COATED ORAL at 08:40

## 2024-03-17 RX ADMIN — LABETALOL HYDROCHLORIDE 100 MG: 200 TABLET, FILM COATED ORAL at 21:18

## 2024-03-17 RX ADMIN — PRENATAL VITAMINS-IRON FUMARATE 27 MG IRON-FOLIC ACID 0.8 MG TABLET 1 TABLET: at 08:40

## 2024-03-17 RX ADMIN — IBUPROFEN 600 MG: 600 TABLET, FILM COATED ORAL at 15:55

## 2024-03-17 RX ADMIN — LEVOTHYROXINE SODIUM 88 MCG: 88 TABLET ORAL at 06:36

## 2024-03-17 RX ADMIN — ACETAMINOPHEN 650 MG: 325 TABLET ORAL at 12:17

## 2024-03-17 RX ADMIN — IBUPROFEN 600 MG: 600 TABLET, FILM COATED ORAL at 02:34

## 2024-03-17 RX ADMIN — ACETAMINOPHEN 650 MG: 325 TABLET ORAL at 00:18

## 2024-03-17 RX ADMIN — ACETAMINOPHEN 650 MG: 325 TABLET ORAL at 17:37

## 2024-03-17 RX ADMIN — IBUPROFEN 600 MG: 600 TABLET, FILM COATED ORAL at 21:18

## 2024-03-17 RX ADMIN — METFORMIN HYDROCHLORIDE 1000 MG: 500 TABLET, FILM COATED ORAL at 08:40

## 2024-03-17 RX ADMIN — SERTRALINE HYDROCHLORIDE 100 MG: 100 TABLET ORAL at 08:40

## 2024-03-17 NOTE — PROGRESS NOTES
3/17/2024    Name:Renetta Monte    MR#:[id number]     PROGRESS NOTE:  Post-Op 2 S/P        Subjective   33 y.o. yo Female  s/p CS at 37w0d doing well. Pain well controlled, lochia appropriate, tolerating diet.       Term pregnancy        Objective    Vitals  Temp:  Temp:  [97.8 °F (36.6 °C)-98.5 °F (36.9 °C)] 97.8 °F (36.6 °C)  Temp src: Oral  BP:  BP: ()/(58-78) 108/60  Pulse:  Heart Rate:  [77-85] 81  RR:   Resp:  [16-18] 16    General Awake, alert, no distress  Abdomen Soft, non-distended, fundus firm, below umbilicus, appropriately tender  Incision  Intact, no erythema or exudate  Extremities Calves NT bilaterally     I/O last 3 completed shifts:  In: 1200 [I.V.:1200]  Out: 2105 [Urine:1425; Blood:680]    LABS:   Lab Results   Component Value Date    WBC 8.65 2024    HGB 10.7 (L) 2024    HCT 33.2 (L) 2024    MCV 95.7 2024    PLT 98 (L) 2024     Component      Latest Ref Rng 3/16/2024   Glucose      70 - 130 mg/dL 113    Glucose       116    Glucose       96        Infant: female       Assessment   1.  POD 2 from  Section  2. T2DM: FSBS wnl since delivery on metformin  3. H/o NSTEMI: on beta blocker. No sx of cardiac disease  4. Hypothyroidism  5. Obesity    Plan:   Routine cares  Continue home meds  Continue FSBS  Ppx LMWH while inpt for obesity    Will want discharge home if infant to be discharged today        Veena Oakes MD  3/17/2024 06:29 EDT

## 2024-03-17 NOTE — PLAN OF CARE
Problem: Adult Inpatient Plan of Care  Goal: Plan of Care Review  Outcome: Ongoing, Progressing  Goal: Patient-Specific Goal (Individualized)  Outcome: Ongoing, Progressing  Goal: Absence of Hospital-Acquired Illness or Injury  Outcome: Ongoing, Progressing  Intervention: Identify and Manage Fall Risk  Recent Flowsheet Documentation  Taken 3/17/2024 0400 by Rebeka Hammer RN  Safety Promotion/Fall Prevention: safety round/check completed  Taken 3/17/2024 0200 by Rebeka Hammer RN  Safety Promotion/Fall Prevention: safety round/check completed  Taken 3/17/2024 0000 by Rebeka Hammer RN  Safety Promotion/Fall Prevention: safety round/check completed  Taken 3/16/2024 2200 by Rebeka Hammer RN  Safety Promotion/Fall Prevention: safety round/check completed  Taken 3/16/2024 2000 by Rebeka Hammer RN  Safety Promotion/Fall Prevention: safety round/check completed  Intervention: Prevent Skin Injury  Recent Flowsheet Documentation  Taken 3/16/2024 2000 by Rebeka Hammer RN  Body Position: position changed independently  Intervention: Prevent and Manage VTE (Venous Thromboembolism) Risk  Recent Flowsheet Documentation  Taken 3/16/2024 2000 by Rebeka Hammer RN  Activity Management: up ad yared  VTE Prevention/Management:   bilateral   sequential compression devices off   patient refused intervention  Goal: Optimal Comfort and Wellbeing  Outcome: Ongoing, Progressing  Intervention: Provide Person-Centered Care  Recent Flowsheet Documentation  Taken 3/16/2024 2000 by Rebeka Hammer RN  Trust Relationship/Rapport:   care explained   choices provided   questions answered   questions encouraged  Goal: Readiness for Transition of Care  Outcome: Ongoing, Progressing     Problem: Adjustment to Role Transition (Postpartum  Delivery)  Goal: Successful Maternal Role Transition  Outcome: Ongoing, Progressing     Problem: Bleeding (Postpartum  Delivery)  Goal:  Hemostasis  Outcome: Ongoing, Progressing     Problem: Infection (Postpartum  Delivery)  Goal: Absence of Infection Signs and Symptoms  Outcome: Ongoing, Progressing     Problem: Pain (Postpartum  Delivery)  Goal: Acceptable Pain Control  Outcome: Ongoing, Progressing     Problem: Postoperative Nausea and Vomiting (Postpartum  Delivery)  Goal: Nausea and Vomiting Relief  Outcome: Ongoing, Progressing     Problem: Postoperative Urinary Retention (Postpartum  Delivery)  Goal: Effective Urinary Elimination  Outcome: Ongoing, Progressing   Goal Outcome Evaluation:

## 2024-03-17 NOTE — LACTATION NOTE
03/17/24 1420   Maternal Information   Date of Referral 03/17/24   Person Making Referral lactation consultant   Maternal Reason for Referral   (consult was placed for lactation however pt states she's bottle feeding.)

## 2024-03-18 VITALS
SYSTOLIC BLOOD PRESSURE: 145 MMHG | DIASTOLIC BLOOD PRESSURE: 77 MMHG | RESPIRATION RATE: 18 BRPM | HEART RATE: 68 BPM | TEMPERATURE: 98.1 F | HEIGHT: 62 IN | BODY MASS INDEX: 38.46 KG/M2 | OXYGEN SATURATION: 97 % | WEIGHT: 209 LBS

## 2024-03-18 LAB
CYTO UR: NORMAL
GLUCOSE BLDC GLUCOMTR-MCNC: 103 MG/DL (ref 70–130)
LAB AP CASE REPORT: NORMAL
LAB AP CLINICAL INFORMATION: NORMAL
PATH REPORT.FINAL DX SPEC: NORMAL
PATH REPORT.GROSS SPEC: NORMAL

## 2024-03-18 PROCEDURE — 82948 REAGENT STRIP/BLOOD GLUCOSE: CPT

## 2024-03-18 RX ORDER — IBUPROFEN 600 MG/1
600 TABLET ORAL EVERY 6 HOURS
Qty: 60 TABLET | Refills: 0 | Status: SHIPPED | OUTPATIENT
Start: 2024-03-18

## 2024-03-18 RX ORDER — PSEUDOEPHEDRINE HCL 30 MG
100 TABLET ORAL 2 TIMES DAILY PRN
Qty: 60 CAPSULE | Refills: 0 | Status: SHIPPED | OUTPATIENT
Start: 2024-03-18

## 2024-03-18 RX ORDER — FUROSEMIDE 20 MG/1
20 TABLET ORAL DAILY
Qty: 7 TABLET | Refills: 0 | Status: SHIPPED | OUTPATIENT
Start: 2024-03-18

## 2024-03-18 RX ORDER — OXYCODONE HYDROCHLORIDE 5 MG/1
5 TABLET ORAL EVERY 6 HOURS PRN
Qty: 12 TABLET | Refills: 0 | Status: SHIPPED | OUTPATIENT
Start: 2024-03-18 | End: 2024-03-22

## 2024-03-18 RX ADMIN — IBUPROFEN 600 MG: 600 TABLET, FILM COATED ORAL at 02:56

## 2024-03-18 RX ADMIN — ACETAMINOPHEN 650 MG: 325 TABLET ORAL at 06:10

## 2024-03-18 RX ADMIN — LEVOTHYROXINE SODIUM 88 MCG: 88 TABLET ORAL at 06:10

## 2024-03-18 RX ADMIN — ACETAMINOPHEN 650 MG: 325 TABLET ORAL at 00:03

## 2024-03-18 RX ADMIN — IBUPROFEN 600 MG: 600 TABLET, FILM COATED ORAL at 09:59

## 2024-03-18 NOTE — PROGRESS NOTES
[facility] [place]  Obstetric Progress Note    Chief Complaint: POD 3    Subjective   Feeling well. Pain controlled. Hoang diet +amb. Lochia appr.     Objective     Vital Signs Range for the last 24 hours  Temp:  [98.4 °F (36.9 °C)-98.5 °F (36.9 °C)] 98.4 °F (36.9 °C)   BP: (129-146)/(67-84) 129/67   Heart Rate:  [82-89] 89   Resp:  [16-18] 16                   Intake/Output last 24 hours:    No intake or output data in the 24 hours ending 03/18/24 0741    Intake/Output this shift:    No intake/output data recorded.    Physical Exam:  General: No acute distress   Heart RRR   Lungs CTAB     Abdomen Soft, appropriately tender, fundus firm, incision CDI   Extremities Exam of extremities: pedal edema tr +       Laboratory Results:  No new      Assessment/Plan: POD 3 s/p RCD/BS  RPOC advance care  2. T2DM: BS normal on metformin  3. Cont home BB  4. Plan dc home today        Brittany Moody MD  3/18/2024  07:41 EDT

## 2024-03-18 NOTE — PLAN OF CARE
Goal Outcome Evaluation:  Vital signs WDL, lochia light, bottle feeding, incision healing, voiding without difficulty.

## 2024-03-18 NOTE — DISCHARGE SUMMARY
[facility]  Discharge Summary      Patient: Renetta Monte            : 1990  MRN: [id number]  CSN: [id number]  Consult:   Consults       Date and Time Order Name Status Description    3/15/2024 10:47 AM Inpatient Anesthesiology Consult                 Gestational Age at Discharge:  37w0d, delivered      Admission  Diagnosis: Term pregnancy    Discharge Diagnosis:   Patient Active Problem List   Diagnosis    Tachycardia    Metabolic syndrome    Gastroparesis    Hypothyroidism    Type 2 diabetes mellitus without complication, without long-term current use of insulin    Precordial pain    History of MI (myocardial infarction)    Multigravida of advanced maternal age in second trimester    Chronic hypertension affecting pregnancy    Hypothyroidism affecting pregnancy    Term pregnancy       Date of Admission: 3/15/2024    Date of Discharge:  3/18/24    Procedures:  RCD and BS           Service:  Obstetrics    Hospital Course:       Pt admitted for  RCD and BS  after pregnancy c/b prior CD, T2DM, h/o NSTEMI. She was delivered without complication. She delivered a VFI with Apgars 8/8/9 via RCD. See note for full detail. Her postpartum course was uncomplicated and was dc'd home on PPD 3    Labs:    Lab Results (last 24 hours)       Procedure Component Value Units Date/Time    POC Glucose Once [[id number]]  (Normal) Collected: 24 0820    Specimen: Blood Updated: 24 0821     Glucose 103 mg/dL     POC Glucose Once [[id number]]  (Abnormal) Collected: 24    Specimen: Blood Updated: 24     Glucose 180 mg/dL     POC Glucose Once [[id number]]  (Normal) Collected: 24 173    Specimen: Blood Updated: 24 1739     Glucose 86 mg/dL     Urinalysis With Culture If Indicated - Urine, Clean Catch [[id number]]  (Abnormal) Collected: 24 1349    Specimen: Urine, Clean Catch Updated: 24 1407     Color, UA Yellow     Appearance, UA Clear     pH, UA 7.0     Specific [place], UA  1.008     Glucose, UA Negative     Ketones, UA Negative     Bilirubin, UA Negative     Blood, UA Moderate (2+)     Protein, UA Negative     Leuk Esterase, UA Negative     Nitrite, UA Negative     Urobilinogen, UA 0.2 E.U./dL    Narrative:      In absence of clinical symptoms, the presence of pyuria, bacteria, and/or nitrites on the urinalysis result does not correlate with infection.    Urinalysis, Microscopic Only - Urine, Clean Catch [[id number]]  (Abnormal) Collected: 03/17/24 1349    Specimen: Urine, Clean Catch Updated: 03/17/24 1407     RBC, UA 21-50 /HPF      WBC, UA 0-2 /HPF      Comment: Urine culture not indicated.        Bacteria, UA None Seen /HPF      Squamous Epithelial Cells, UA 0-2 /HPF      Hyaline Casts, UA 0-6 /LPF      Methodology Automated Microscopy    POC Glucose Once [[id number]]  (Abnormal) Collected: 03/17/24 1039    Specimen: Blood Updated: 03/17/24 1040     Glucose 144 mg/dL           HOSPITAL LABS:  Lab Results   Component Value Date    WBC 8.65 03/16/2024    HGB 10.7 (L) 03/16/2024    HCT 33.2 (L) 03/16/2024    MCV 95.7 03/16/2024    PLT 98 (L) 03/16/2024    POCGLU 103 03/18/2024    CREATININE 0.75 08/30/2023    URICACID 3.8 08/30/2023    AST 20 08/30/2023    ALT 22 08/30/2023     (L) 08/30/2023     Results from last 7 days   Lab Units 03/13/24  1444   ABO TYPING  O   RH TYPING  Positive   ANTIBODY SCREEN  Negative       IMAGING        Discharge Medications     Discharge Medications        New Medications        Instructions Start Date   docusate sodium 100 MG capsule   100 mg, Oral, 2 Times Daily PRN      furosemide 20 MG tablet  Commonly known as: Lasix   20 mg, Oral, Daily      ibuprofen 600 MG tablet  Commonly known as: ADVIL,MOTRIN   600 mg, Oral, Every 6 Hours      oxyCODONE 5 MG immediate release tablet  Commonly known as: ROXICODONE   5 mg, Oral, Every 6 Hours PRN             Continue These Medications        Instructions Start Date   B-D ULTRAFINE III SHORT PEN 31G X 8 MM  misc  Generic drug: Insulin Pen Needle       Pen Needles 31G X 5 MM misc   1 each, Does not apply, 5 Times Daily, Use 1 each 5(Five) times a day.      cetirizine 10 MG tablet  Commonly known as: zyrTEC   10 mg, Oral, Nightly      Dexcom G6  device       Dexcom G6 Sensor       Dexcom G6 Transmitter misc       freestyle lancets       FREESTYLE LITE test strip  Generic drug: glucose blood   1 each, Other, As Needed      labetalol 100 MG tablet  Commonly known as: NORMODYNE   100 mg, Oral, 2 Times Daily      levothyroxine 88 MCG tablet  Commonly known as: SYNTHROID, LEVOTHROID   88 mcg, Oral, Daily      MAGNESIUM-ZINC PO   200 mg, Oral, Daily      metFORMIN 1000 MG tablet  Commonly known as: GLUCOPHAGE   1,000 mg, Oral, 2 Times Daily      prenatal (CLASSIC) vitamin 28-0.8 MG tablet tablet  Generic drug: prenatal vitamin   1 tablet, Oral, Daily      sertraline 100 MG tablet  Commonly known as: ZOLOFT   100 mg, Oral, Daily             Stop These Medications      amoxicillin-clavulanate 875-125 MG per tablet  Commonly known as: AUGMENTIN     aspirin 81 MG EC tablet     doxylamine 25 MG tablet  Commonly known as: UNISOM     insulin aspart 100 UNIT/ML solution pen-injector sc pen  Commonly known as: novoLOG FLEXPEN     Levemir FlexPen 100 UNIT/ML injection  Generic drug: insulin detemir              Allergies:   Allergies   Allergen Reactions    Gluten Meal Hives    Sulfa Antibiotics Swelling and Rash        Discharge Disposition:  To Home    Discharge Condition:  Stable    Discharge Diet: Regular    Activity at Discharge: pelvic rest, no lifting    Follow-up Appointments: 2 wk        Brittany Moody MD  03/18/24  08:42 EDT

## 2024-03-26 ENCOUNTER — TELEPHONE (OUTPATIENT)
Dept: OBSTETRICS AND GYNECOLOGY | Facility: HOSPITAL | Age: 34
End: 2024-03-26
Payer: COMMERCIAL

## 2024-03-26 NOTE — TELEPHONE ENCOUNTER
Left message checking on pt after delivery.  Ask pt to send any additional information about her blood sugars through my chart, or any meds she is taking currently.  Will do my chart message

## 2024-03-28 ENCOUNTER — TELEPHONE (OUTPATIENT)
Dept: OBSTETRICS AND GYNECOLOGY | Facility: HOSPITAL | Age: 34
End: 2024-03-28
Payer: COMMERCIAL

## 2024-03-28 ENCOUNTER — MATERNAL SCREENING (OUTPATIENT)
Dept: CALL CENTER | Facility: HOSPITAL | Age: 34
End: 2024-03-28
Payer: COMMERCIAL

## 2024-03-28 NOTE — TELEPHONE ENCOUNTER
"Called and let pt know that Dr Alexandre wrote the following message: \"Overall not bad. I think the higher post-meals are what you are eating. Try to continue to watch carb intake and eat more whole fruits and vegetables, whole grains, and proteins.\" Pt states she is gluten intolerant and cannot do whole grains. States she will try to eat more protein, fruits and veggies. She is working on establishing care with a new primary dr.  "

## 2024-03-28 NOTE — OUTREACH NOTE
Maternal Screening Survey      Flowsheet Row Responses   Facility patient discharged from? [facility]   Attempt successful? Yes   Call start time    Call end time 161   Person spoke with today (if not patient) and relationship patient   EPD Scale: Able to Laugh 0-->as much as she always could   EPD Scale: Looked Forward 0-->as much as she ever did   EPD Scale: Blamed Self 0-->no, never   EPD Scale: Been Anxious 0-->no, not at all   EPD Scale: Felt Panicky 0-->no, not at all   EPD Scale: Things Getting on Top 0-->no, has been coping as well as ever   EPD Scale: Difficulty Sleeping 0-->no, not at all   EPD Scale: Sad or Miserable 0-->no, not at all   EPD Scale: Crying 0-->no, never   EPD Scale: Thought of Harming Self 0-->never   [place]  Depression Score 0   Did any of your parents have problems with alcohol or drug use? No   Do any of your peers have problems with alcohol or drug use? No   Does your partner have problems with alcohol or drug use? No   Before you were pregnant did you have problems with alcohol or drug use? (past) No   In the past month, did you drink beer, wine, liquor or use any other drugs? (pregnancy) No   Maternal Screening call completed Yes   Call end time               Dean MATA - Registered Nurse

## 2024-04-02 ENCOUNTER — TELEPHONE (OUTPATIENT)
Dept: OBSTETRICS AND GYNECOLOGY | Facility: HOSPITAL | Age: 34
End: 2024-04-02
Payer: COMMERCIAL

## 2024-04-02 NOTE — TELEPHONE ENCOUNTER
Spoke with patient over the phone.  Patient states she had a reaction to the glue used on her incision and is now on steroids..  She has 3 days left.  She also states her blood sugar is increased and that Dr. Pack started her back on levemir 10 units every am.  Patient states she will have her PCP follow her for her T2DM.  Informed patient we will run her report next week.  Patient voices understanding.  Patient states otherwise she and baby are doing well.

## 2024-04-11 ENCOUNTER — TELEPHONE (OUTPATIENT)
Dept: OBSTETRICS AND GYNECOLOGY | Facility: HOSPITAL | Age: 34
End: 2024-04-11
Payer: COMMERCIAL

## 2024-04-11 NOTE — TELEPHONE ENCOUNTER
Spoke with patient over the phone.  Informed patient that Dr. Alexandre has reviewed her blood sugar log and states I would continue the levemir.  It is making your blood sugars excellent!.  Patient states she has also added back Novolog 4 units with breakfast only.  Patient states she has not made an appointment with her PCP yet, that she was in the hospital last week with hemorrhaging and had to have a D&C.  She will try to get that done this week.  Informed patient we will continue to follow her blood sugars for now.  Patient voices understanding.  Mariya Mandel RN

## 2024-04-17 ENCOUNTER — TELEPHONE (OUTPATIENT)
Dept: OBSTETRICS AND GYNECOLOGY | Facility: HOSPITAL | Age: 34
End: 2024-04-17
Payer: COMMERCIAL

## 2024-04-17 NOTE — TELEPHONE ENCOUNTER
Spoke with patient over the phone.  Informed patient that Dr. Alexandre has reviewed her blood sugar log and wants her to add Novolog 4 units to all of her meals.  Patient states she has an appointment with her PCP tomorrow for management of her diabetes.  In patients chart there is an appointment listed with Dr. Marcellus Martinez, endocrinology for 7/18/24.  Patient states she doesn't know who that is.  Patient states her PCP will be managing her.  Informed patient we will check with her next week.  Patient voices understanding.  Mariya Mandel RN

## 2024-05-01 ENCOUNTER — TELEPHONE (OUTPATIENT)
Dept: OBSTETRICS AND GYNECOLOGY | Facility: HOSPITAL | Age: 34
End: 2024-05-01
Payer: COMMERCIAL

## 2024-05-01 NOTE — TELEPHONE ENCOUNTER
"Pt called from [facility] to report this am her incision has \"opened up\" approx 1 inch on the right side.  Pt reports P C/s 2 weeks ago   Spoke with Dr Anderson, instructed pt to come to [facility] office to have it looked at.  Pt with vu   "

## 2024-05-08 ENCOUNTER — OFFICE VISIT (OUTPATIENT)
Dept: CARDIOLOGY | Facility: CLINIC | Age: 34
End: 2024-05-08
Payer: COMMERCIAL

## 2024-05-08 VITALS
DIASTOLIC BLOOD PRESSURE: 78 MMHG | SYSTOLIC BLOOD PRESSURE: 120 MMHG | BODY MASS INDEX: 30.73 KG/M2 | HEIGHT: 62 IN | HEART RATE: 72 BPM | WEIGHT: 167 LBS

## 2024-05-08 DIAGNOSIS — E11.9 TYPE 2 DIABETES MELLITUS WITHOUT COMPLICATION, WITHOUT LONG-TERM CURRENT USE OF INSULIN: ICD-10-CM

## 2024-05-08 DIAGNOSIS — E03.9 HYPOTHYROIDISM, UNSPECIFIED TYPE: ICD-10-CM

## 2024-05-08 DIAGNOSIS — R00.0 TACHYCARDIA: Primary | ICD-10-CM

## 2024-05-08 RX ORDER — ASPIRIN 81 MG/1
81 TABLET ORAL DAILY
COMMUNITY

## 2024-05-08 RX ORDER — LABETALOL 100 MG/1
100 TABLET, FILM COATED ORAL 2 TIMES DAILY
Qty: 180 TABLET | Refills: 3 | Status: SHIPPED | OUTPATIENT
Start: 2024-05-08

## 2024-05-08 RX ORDER — FERROUS SULFATE 324(65)MG
324 TABLET, DELAYED RELEASE (ENTERIC COATED) ORAL
COMMUNITY

## 2025-06-17 ENCOUNTER — TELEPHONE (OUTPATIENT)
Dept: CARDIOLOGY | Facility: CLINIC | Age: 35
End: 2025-06-17
Payer: COMMERCIAL

## 2025-06-17 NOTE — TELEPHONE ENCOUNTER
Received cardiac clearance request, printed and put in YONNY Hernandez door for review. Patient has an upcoming appointment the end of this month with YONNY Whittaker.

## 2025-06-24 ENCOUNTER — OFFICE VISIT (OUTPATIENT)
Dept: CARDIOLOGY | Facility: CLINIC | Age: 35
End: 2025-06-24
Payer: COMMERCIAL

## 2025-06-24 VITALS
WEIGHT: 169.8 LBS | HEIGHT: 62 IN | BODY MASS INDEX: 31.25 KG/M2 | DIASTOLIC BLOOD PRESSURE: 70 MMHG | OXYGEN SATURATION: 97 % | SYSTOLIC BLOOD PRESSURE: 100 MMHG | HEART RATE: 89 BPM

## 2025-06-24 DIAGNOSIS — E11.9 TYPE 2 DIABETES MELLITUS WITHOUT COMPLICATION, WITHOUT LONG-TERM CURRENT USE OF INSULIN: ICD-10-CM

## 2025-06-24 DIAGNOSIS — R00.0 TACHYCARDIA: Primary | ICD-10-CM

## 2025-06-24 DIAGNOSIS — E03.9 HYPOTHYROIDISM, UNSPECIFIED TYPE: ICD-10-CM

## 2025-06-24 PROCEDURE — 99214 OFFICE O/P EST MOD 30 MIN: CPT | Performed by: NURSE PRACTITIONER

## 2025-06-24 RX ORDER — SEMAGLUTIDE 0.68 MG/ML
INJECTION, SOLUTION SUBCUTANEOUS
COMMUNITY

## 2025-06-24 RX ORDER — LABETALOL 100 MG/1
100 TABLET, FILM COATED ORAL 2 TIMES DAILY
Qty: 180 TABLET | Refills: 3 | Status: SHIPPED | OUTPATIENT
Start: 2025-06-24

## 2025-06-24 RX ORDER — BUSPIRONE HYDROCHLORIDE 5 MG/1
TABLET ORAL
COMMUNITY

## 2025-06-24 RX ORDER — DAPAGLIFLOZIN 10 MG/1
TABLET, FILM COATED ORAL
COMMUNITY

## 2025-06-24 RX ORDER — EZETIMIBE 10 MG/1
TABLET ORAL EVERY 24 HOURS
COMMUNITY
Start: 2025-01-09

## 2025-06-24 NOTE — PROGRESS NOTES
Chief Complaint   Patient presents with    Follow-up     Cardiac management - last seen 5/2024 with Keiko BLANCAS     Rapid Heart Rate     Echo 9/2023    No palpations     She states chest pain / tightness with anxiety attacks     labs     Last set 2024    medication     Went over verbally     Daily aspirin     Refill pended     Patient brought in medicine list to appointment, it's been reviewed with patient and med list was updated in the chart.          HPI:  HPI   Renetta Monte is a 34 y.o. female seen in April 2023 for initial cardiac consultation.  She has DM, and hypothyroidism followed by specialist in [place].  She had developed episodes of chest pressure and EKG showed some T wave changes therefore referred to emergency room at [place].  She was informed she had MI and underwent cardiac catheterization.  According to patient no significant CAD noted.  Apparently echocardiogram showed possible pericarditis.  Beta-blocker was stopped otherwise no medication changes noted.  At consultation she admitted to palpitations and heart racing, fatigue, and abdominal discomfort.  Metoprolol XL 25 mg daily was prescribed.  Clinical exam was negative for pericardial rub.  Bentyl was added for possible symptoms of gastroparesis.  At follow-up visit in October 2023 she was pregnant with due date of 4/5/2023.  Beta-blocker was changed to labetalol.     2024 she delivered a healthy baby girl.  A few weeks after delivery she experienced hemorrhage requiring D&C and blood transfusion.      She returns today for a follow-up visit. Patient denies chest pain, pressure, palpitations, tightness, dizziness, shortness of air. She reports chest tightness during anxiety attacks and she goes to quiet place and clams herself and the chest tightness subsides. She is doing well overall.  She is planning on an uterine ablation coming up    Cardiac History:    Past Surgical History:   Procedure Laterality Date    CARDIAC  CATHETERIZATION  04/15/2023     SECTION       SECTION Bilateral 03/15/2024    Procedure:  SECTION REPEAT WITH SALPINGECTOMY;  Surgeon: Brittany Moody MD;  Location: [facility] LABOR DELIVERY;  Service: Obstetrics/Gynecology;  Laterality: Bilateral;    CHOLECYSTECTOMY      ECHO - CONVERTED  2023    ECHO - CONVERTED  2023    EF 60%. Trace MR. RVSP- 13 mmHg    EXPLORATORY LAPAROTOMY      HAND SURGERY Right     TONSILLECTOMY AND ADENOIDECTOMY         Current Outpatient Medications   Medication Sig Dispense Refill    aspirin 81 MG EC tablet Take 1 tablet by mouth Daily.      busPIRone (BUSPAR) 5 MG tablet take 1 tablet by mouth twice a day for 90 days      cetirizine (zyrTEC) 10 MG tablet Take 1 tablet by mouth Every Night.      Continuous Blood Gluc  (Dexcom G6 ) device       ezetimibe (ZETIA) 10 MG tablet Daily.      Farxiga 10 MG tablet take 1 tablet by mouth every day for 90 days      ibuprofen (ADVIL,MOTRIN) 600 MG tablet Take 1 tablet by mouth Every 6 (Six) Hours. 60 tablet 0    labetalol (NORMODYNE) 100 MG tablet Take 1 tablet by mouth 2 (Two) Times a Day. 180 tablet 3    levothyroxine (SYNTHROID, LEVOTHROID) 100 MCG tablet Take 1 tablet by mouth Daily.      MAGNESIUM-ZINC PO Take 200 mg by mouth Daily.      Ozempic, 0.25 or 0.5 MG/DOSE, 2 MG/3ML solution pen-injector INJECT 0.5 MG UNDER THE SKIN ONCE PER WEEK      sertraline (ZOLOFT) 100 MG tablet Take 1 tablet by mouth Daily.       No current facility-administered medications for this visit.       Glue [wound dressing adhesive], Gluten meal, and Sulfa antibiotics    Past Medical History:   Diagnosis Date    Chest pressure     Chronic hypertension     labetalol    Gluten intolerance     Hemorrhage after delivery of fetus 2018    History of myocardial infarct at age less than 60 years 2023    s/p heart cath -- all clear    Hypothyroidism     Pneumonia     Type 2 diabetes mellitus     on insulin  "      Social History     Socioeconomic History    Marital status:    Tobacco Use    Smoking status: Never    Smokeless tobacco: Never   Vaping Use    Vaping status: Never Used   Substance and Sexual Activity    Alcohol use: Not Currently    Drug use: Never    Sexual activity: Yes     Partners: Male       Family History   Problem Relation Age of Onset    Hypertension Mother     Hypertension Father     Hypertension Maternal Grandmother     Diabetes Maternal Grandmother     Hypertension Maternal Grandfather     Diabetes Maternal Grandfather     Heart disease Maternal Grandfather     Cancer Paternal Grandmother     Cancer Paternal Grandfather     No Known Problems Daughter        Vitals:   /70 (BP Location: Left arm, Patient Position: Sitting, Cuff Size: Adult)   Pulse 89   Ht 157.5 cm (62\")   Wt 77 kg (169 lb 12.8 oz)   SpO2 97%   BMI 31.06 kg/m²     Physical Exam  Vitals and nursing note reviewed.   Constitutional:       Appearance: She is overweight.   Neck:      Vascular: No carotid bruit.   Cardiovascular:      Rate and Rhythm: Normal rate and regular rhythm.      Pulses: Normal pulses.      Heart sounds: Normal heart sounds. No murmur heard.     No friction rub. No gallop.   Pulmonary:      Effort: Pulmonary effort is normal.      Breath sounds: Normal breath sounds and air entry.   Musculoskeletal:      Right lower leg: No edema.      Left lower leg: No edema.   Skin:     General: Skin is warm and dry.      Capillary Refill: Capillary refill takes less than 2 seconds.   Neurological:      Mental Status: She is alert and oriented to person, place, and time.       Procedures     Assessment & Plan     Diagnoses and all orders for this visit:    1. Tachycardia (Primary)  -     labetalol (NORMODYNE) 100 MG tablet; Take 1 tablet by mouth 2 (Two) Times a Day.  Dispense: 180 tablet; Refill: 3    2. Type 2 diabetes mellitus without complication, without long-term current use of insulin    3. " Hypothyroidism, unspecified type    Tachycardia  - Controlled with labetalol, continue    DM2/hypothyroidism  - Managed with PCP    Stable from a cardiac standpoint. No further testing recommended at this time. No medication changes made today. Refill sent to pharmacy.      Visit Diagnoses:    ICD-10-CM ICD-9-CM   1. Tachycardia  R00.0 785.0   2. Type 2 diabetes mellitus without complication, without long-term current use of insulin  E11.9 250.00   3. Hypothyroidism, unspecified type  E03.9 244.9       Meds Ordered During Visit:  New Medications Ordered This Visit   Medications    labetalol (NORMODYNE) 100 MG tablet     Sig: Take 1 tablet by mouth 2 (Two) Times a Day.     Dispense:  180 tablet     Refill:  3       Follow Up Appointment:   Return in about 1 year (around 6/24/2026), or if symptoms worsen or fail to improve.           This document has been electronically signed by YONNY Whittaker  June 24, 2025 13:03 EDT    Dictated Utilizing Dragon Dictation: Part of this note may be an electronic transcription/translation of spoken language to printed text using the Dragon Dictation System.

## (undated) DEVICE — SUT GUT CHRM 2/0 CT1 27IN 811H

## (undated) DEVICE — SUT VIC 3/0 CT1 27IN DYED J338H

## (undated) DEVICE — TBG PENCL TELESCP MEGADYNE SMOKE EVAC 10FT

## (undated) DEVICE — SUT MONOCRYL SZ 4 0 18IN PS1 Y682H BX/36

## (undated) DEVICE — 4-PORT MANIFOLD: Brand: NEPTUNE 2

## (undated) DEVICE — TRY SPINE BLCK WHITACRE 25G 3X5IN

## (undated) DEVICE — APPL CHLORAPREP TINTED 26ML TEAL

## (undated) DEVICE — ENSEAL 20 CM SHAFT, LARGE JAW: Brand: ENSEAL X1

## (undated) DEVICE — SUT GUT CHRM 1 CTX 36IN 905H

## (undated) DEVICE — SOL IRR NACL 0.9PCT BT 1000ML

## (undated) DEVICE — PK C/SECT 10

## (undated) DEVICE — GLV SURG BIOGEL LTX PF 6

## (undated) DEVICE — CLTH CLENS READYCLEANSE PERI CARE PK/5

## (undated) DEVICE — ADHS SKIN PREMIERPRO EXOFIN TOPICAL HI/VISC .5ML

## (undated) DEVICE — PATIENT RETURN ELECTRODE, SINGLE-USE, CONTACT QUALITY MONITORING, ADULT, WITH 9FT CORD, FOR PATIENTS WEIGING OVER 33LBS. (15KG): Brand: MEGADYNE

## (undated) DEVICE — TRAP FLD MINIVAC MEGADYNE 100ML

## (undated) DEVICE — SUT GUT PLN 0 STD TIE 54IN S104H

## (undated) DEVICE — EXTRA LARGE, DISPOSABLE C-SECTION PROTECTOR - RETRACTOR: Brand: ALEXIS ® O C-SECTION PROTECTOR - RETRACTOR

## (undated) DEVICE — MAT PREVALON MOBL TRANSFR AIR W/PAD REPROC 39X81IN

## (undated) DEVICE — COATED VICRYL  (POLYGLACTIN 910) SUTURE, VIOLET BRAIDED, STERILE, SYNTHETIC ABSORBABLE SUTURE: Brand: COATED VICRYL

## (undated) DEVICE — SOL IRR H2O BTL 1000ML STRL

## (undated) DEVICE — GLV SURG BIOGEL LTX PF 6 1/2